# Patient Record
Sex: MALE | Race: BLACK OR AFRICAN AMERICAN | Employment: OTHER | URBAN - METROPOLITAN AREA
[De-identification: names, ages, dates, MRNs, and addresses within clinical notes are randomized per-mention and may not be internally consistent; named-entity substitution may affect disease eponyms.]

---

## 2017-04-26 ENCOUNTER — IP HISTORICAL/CONVERTED ENCOUNTER (OUTPATIENT)
Dept: OTHER | Age: 67
End: 2017-04-26

## 2018-06-26 ENCOUNTER — ED HISTORICAL/CONVERTED ENCOUNTER (OUTPATIENT)
Dept: OTHER | Age: 68
End: 2018-06-26

## 2019-08-08 ENCOUNTER — OP HISTORICAL/CONVERTED ENCOUNTER (OUTPATIENT)
Dept: OTHER | Age: 69
End: 2019-08-08

## 2021-01-11 ENCOUNTER — OFFICE VISIT (OUTPATIENT)
Dept: FAMILY MEDICINE CLINIC | Age: 71
End: 2021-01-11
Payer: MEDICARE

## 2021-01-11 VITALS
HEIGHT: 67 IN | SYSTOLIC BLOOD PRESSURE: 170 MMHG | BODY MASS INDEX: 30.13 KG/M2 | HEART RATE: 67 BPM | DIASTOLIC BLOOD PRESSURE: 96 MMHG | OXYGEN SATURATION: 99 % | TEMPERATURE: 96.8 F | RESPIRATION RATE: 20 BRPM | WEIGHT: 192 LBS

## 2021-01-11 DIAGNOSIS — I11.9 MALIGNANT HYPERTENSIVE HEART DISEASE WITHOUT HEART FAILURE: ICD-10-CM

## 2021-01-11 DIAGNOSIS — I25.2 HX OF MYOCARDIAL INFARCTION, GREATER THAN 8 WEEKS: ICD-10-CM

## 2021-01-11 DIAGNOSIS — Z79.4 TYPE 2 DIABETES MELLITUS WITH OTHER CIRCULATORY COMPLICATION, WITH LONG-TERM CURRENT USE OF INSULIN (HCC): ICD-10-CM

## 2021-01-11 DIAGNOSIS — R03.0 ELEVATED BLOOD PRESSURE READING: ICD-10-CM

## 2021-01-11 DIAGNOSIS — Z95.810 AUTOMATIC IMPLANTABLE CARDIAC DEFIBRILLATOR IN SITU: ICD-10-CM

## 2021-01-11 DIAGNOSIS — Z71.6 TOBACCO ABUSE COUNSELING: ICD-10-CM

## 2021-01-11 DIAGNOSIS — F17.200 TOBACCO DEPENDENCE: ICD-10-CM

## 2021-01-11 DIAGNOSIS — Z86.010 HISTORY OF ADENOMATOUS POLYP OF COLON: ICD-10-CM

## 2021-01-11 DIAGNOSIS — K21.00 GASTROESOPHAGEAL REFLUX DISEASE WITH ESOPHAGITIS WITHOUT HEMORRHAGE: Primary | ICD-10-CM

## 2021-01-11 DIAGNOSIS — E11.59 TYPE 2 DIABETES MELLITUS WITH OTHER CIRCULATORY COMPLICATION, WITH LONG-TERM CURRENT USE OF INSULIN (HCC): ICD-10-CM

## 2021-01-11 PROBLEM — I10 ESSENTIAL HYPERTENSION: Status: ACTIVE | Noted: 2021-01-11

## 2021-01-11 PROBLEM — L02.811 ABSCESS OF HEAD, EXCEPT FACE: Status: ACTIVE | Noted: 2021-01-11

## 2021-01-11 PROBLEM — L98.9 LESION OF SKIN OF SCALP: Status: ACTIVE | Noted: 2021-01-11

## 2021-01-11 PROBLEM — R73.9 HYPERGLYCEMIA: Status: ACTIVE | Noted: 2021-01-11

## 2021-01-11 PROBLEM — K80.20 CHOLELITHIASIS: Status: ACTIVE | Noted: 2021-01-11

## 2021-01-11 PROBLEM — I21.9 MYOCARDIAL INFARCTION (HCC): Status: ACTIVE | Noted: 2021-01-11

## 2021-01-11 PROBLEM — E78.5 HYPERLIPIDEMIA: Status: ACTIVE | Noted: 2021-01-11

## 2021-01-11 PROBLEM — R01.1 SYSTOLIC MURMUR: Status: ACTIVE | Noted: 2021-01-11

## 2021-01-11 PROBLEM — R10.13 EPIGASTRIC PAIN: Status: ACTIVE | Noted: 2021-01-11

## 2021-01-11 PROBLEM — E11.9 TYPE II DIABETES MELLITUS (HCC): Status: ACTIVE | Noted: 2021-01-11

## 2021-01-11 PROBLEM — R10.9 ABDOMINAL PAIN: Status: ACTIVE | Noted: 2021-01-11

## 2021-01-11 PROBLEM — K21.9 GASTROESOPHAGEAL REFLUX DISEASE: Status: ACTIVE | Noted: 2021-01-11

## 2021-01-11 LAB
GLUCOSE POC: 182 MG/DL
HBA1C MFR BLD HPLC: 8.7 %

## 2021-01-11 PROCEDURE — 83036 HEMOGLOBIN GLYCOSYLATED A1C: CPT | Performed by: FAMILY MEDICINE

## 2021-01-11 PROCEDURE — 99214 OFFICE O/P EST MOD 30 MIN: CPT | Performed by: FAMILY MEDICINE

## 2021-01-11 PROCEDURE — 82947 ASSAY GLUCOSE BLOOD QUANT: CPT | Performed by: FAMILY MEDICINE

## 2021-01-11 RX ORDER — INSULIN ASPART 100 [IU]/ML
INJECTION, SUSPENSION SUBCUTANEOUS
COMMUNITY
End: 2021-01-11 | Stop reason: SDUPTHER

## 2021-01-11 RX ORDER — LISINOPRIL 10 MG/1
TABLET ORAL DAILY
COMMUNITY

## 2021-01-11 RX ORDER — CLOPIDOGREL BISULFATE 75 MG/1
75 TABLET ORAL DAILY
Status: ON HOLD | COMMUNITY
End: 2021-06-01

## 2021-01-11 RX ORDER — ATORVASTATIN CALCIUM 40 MG/1
40 TABLET, FILM COATED ORAL DAILY
Qty: 90 TAB | Refills: 0 | Status: SHIPPED | OUTPATIENT
Start: 2021-01-11 | End: 2021-05-25 | Stop reason: SDUPTHER

## 2021-01-11 RX ORDER — RANITIDINE 150 MG/1
150 TABLET, FILM COATED ORAL 2 TIMES DAILY
Status: ON HOLD | COMMUNITY
End: 2021-06-01

## 2021-01-11 RX ORDER — ATORVASTATIN CALCIUM 40 MG/1
TABLET, FILM COATED ORAL DAILY
COMMUNITY
End: 2021-01-11 | Stop reason: SDUPTHER

## 2021-01-11 RX ORDER — PIOGLITAZONEHYDROCHLORIDE 45 MG/1
TABLET ORAL DAILY
COMMUNITY
End: 2021-01-11 | Stop reason: SDUPTHER

## 2021-01-11 RX ORDER — NICOTINE 7MG/24HR
1 PATCH, TRANSDERMAL 24 HOURS TRANSDERMAL EVERY 24 HOURS
Status: ON HOLD | COMMUNITY
End: 2021-06-01

## 2021-01-11 RX ORDER — DILTIAZEM HYDROCHLORIDE 120 MG/1
120 TABLET, FILM COATED ORAL DAILY
COMMUNITY
End: 2021-08-05

## 2021-01-11 RX ORDER — METOPROLOL SUCCINATE 50 MG/1
50 TABLET, EXTENDED RELEASE ORAL DAILY
Qty: 90 TAB | Refills: 0 | Status: SHIPPED | OUTPATIENT
Start: 2021-01-11 | End: 2021-08-05

## 2021-01-11 RX ORDER — PIOGLITAZONEHYDROCHLORIDE 45 MG/1
45 TABLET ORAL DAILY
Qty: 90 TAB | Refills: 0 | Status: SHIPPED | OUTPATIENT
Start: 2021-01-11 | End: 2021-04-11

## 2021-01-11 RX ORDER — METFORMIN HYDROCHLORIDE 1000 MG/1
1000 TABLET ORAL 2 TIMES DAILY WITH MEALS
COMMUNITY
End: 2022-01-26 | Stop reason: SDUPTHER

## 2021-01-11 RX ORDER — INSULIN ASPART 100 [IU]/ML
45 INJECTION, SUSPENSION SUBCUTANEOUS 2 TIMES DAILY
Qty: 5 PEN | Refills: 2 | Status: SHIPPED | OUTPATIENT
Start: 2021-01-11 | End: 2021-02-10

## 2021-01-11 RX ORDER — METOPROLOL SUCCINATE 50 MG/1
TABLET, EXTENDED RELEASE ORAL DAILY
COMMUNITY
End: 2021-01-11 | Stop reason: SDUPTHER

## 2021-01-11 NOTE — PROGRESS NOTES
Dash Lizama is a 79 y.o. male and presents with Follow Up Chronic Condition, Hypertension, and Medication Refill  . HPI     Subjective:  Cardiovascular Review:  The patient has hypertension   Diet and Lifestyle: generally follows a low fat low cholesterol diet, generally follows a low sodium diet, exercises sporadically  Home BP Monitoring: is not measured at home. Pertinent ROS: taking medications as instructed, no medication side effects noted, no TIA's, no chest pain on exertion, no dyspnea on exertion, no swelling of ankles. Review of Systems  Review of Systems   Constitutional: Negative. Negative for chills and fever. HENT: Negative. Negative for congestion, ear discharge, hearing loss, nosebleeds and tinnitus. Eyes: Negative. Negative for blurred vision, double vision, photophobia and pain. Respiratory: Negative. Negative for cough, hemoptysis and sputum production. Cardiovascular: Negative. Negative for chest pain and palpitations. Gastrointestinal: Negative. Negative for heartburn, nausea and vomiting. Genitourinary: Negative. Negative for dysuria, frequency and urgency. Musculoskeletal: Negative. Negative for back pain and myalgias. Skin: Negative. Neurological: Negative. Negative for dizziness, tingling, weakness and headaches. Endo/Heme/Allergies: Negative. Psychiatric/Behavioral: Negative. Negative for depression and suicidal ideas. The patient does not have insomnia. All other systems reviewed and are negative.         Past Medical History:   Diagnosis Date    Abdominal pain 1/11/2021    Abscess of head, except face 1/11/2021    Automatic implantable cardiac defibrillator in situ 1/11/2021    Cholelithiasis 1/11/2021    Elevated blood pressure reading 1/11/2021    Epigastric pain 1/11/2021    Essential hypertension 1/11/2021    Gastroesophageal reflux disease 1/11/2021    History of adenomatous polyp of colon 1/11/2021    Hyperglycemia 1/11/2021    Hyperlipidemia 1/11/2021    Lesion of skin of scalp 1/11/2021    Myocardial infarction ([facility] 0/80/5545    Systolic murmur 9/20/0279    Tobacco dependence 1/11/2021    Type II diabetes mellitus ([facility] 1/11/2021     Past Surgical History:   Procedure Laterality Date    HX IMPLANTABLE CARDIOVERTER DEFIBRILLATOR      HX PROSTATE SURGERY       Social History     Socioeconomic History    Marital status:      Spouse name: Not on file    Number of children: Not on file    Years of education: Not on file    Highest education level: Not on file   Tobacco Use    Smoking status: Current Every Day Smoker    Smokeless tobacco: Never Used   Substance and Sexual Activity    Alcohol use: Not Currently     No family history on file. Current Outpatient Medications   Medication Sig Dispense Refill    atorvastatin (LIPITOR) 40 mg tablet Take  by mouth daily.  clopidogreL (PLAVIX) 75 mg tab Take  by mouth.  dilTIAZem ER (CARDIZEM LA) 120 mg tablet Take 120 mg by mouth daily.  SITagliptin (Januvia) 100 mg tablet Take 100 mg by mouth daily.  empagliflozin (Jardiance) 10 mg tablet Take  by mouth daily.  lisinopriL (PRINIVIL, ZESTRIL) 10 mg tablet Take  by mouth daily.  metFORMIN (GLUCOPHAGE) 1,000 mg tablet Take 1,000 mg by mouth two (2) times daily (with meals).  metoprolol succinate (TOPROL-XL) 50 mg XL tablet Take  by mouth daily.  nicotine (NICODERM CQ) 7 mg/24 hr 1 Patch by TransDERmal route every twenty-four (24) hours.  insulin aspart protamine/insulin aspart (NovoLOG Mix 70-30FlexPen U-100) 100 unit/mL (70-30) inpn by SubCUTAneous route. Inject 45 units BID      pioglitazone (ACTOS) 45 mg tablet Take  by mouth daily.  raNITIdine (ZANTAC) 150 mg tablet Take 150 mg by mouth two (2) times a day.        No Known Allergies    Objective:  Visit Vitals  BP (!) 170/96 (BP 1 Location: Right arm, BP Patient Position: Sitting)   Pulse 67   Temp 96.8 °F (36 °C) (Temporal)   Resp 20   Ht 5' 7\" (1.702 m)   Wt 192 lb (87.1 kg)   SpO2 99% Comment: longo air   BMI 30.07 kg/m²       Physical Exam:   Physical Exam  Vitals signs and nursing note reviewed. Constitutional:       General: He is not in acute distress. Appearance: Normal appearance. He is obese. He is not ill-appearing, toxic-appearing or diaphoretic. HENT:      Head: Normocephalic and atraumatic. Right Ear: Tympanic membrane, ear canal and external ear normal. There is no impacted cerumen. Left Ear: Tympanic membrane, ear canal and external ear normal. There is no impacted cerumen. Nose: Nose normal. No congestion or rhinorrhea. Mouth/Throat:      Mouth: Mucous membranes are moist.      Pharynx: Oropharynx is clear. No oropharyngeal exudate or posterior oropharyngeal erythema. Eyes:      General: No scleral icterus. Right eye: No discharge. Left eye: No discharge. Extraocular Movements: Extraocular movements intact. Conjunctiva/sclera: Conjunctivae normal.      Pupils: Pupils are equal, round, and reactive to light. Neck:      Musculoskeletal: Normal range of motion and neck supple. No neck rigidity or muscular tenderness. Vascular: No carotid bruit. Cardiovascular:      Rate and Rhythm: Normal rate and regular rhythm. Pulses: Normal pulses. Heart sounds: Normal heart sounds. No murmur. No friction rub. No gallop. Pulmonary:      Effort: Pulmonary effort is normal. No respiratory distress. Breath sounds: Normal breath sounds. No stridor. No wheezing, rhonchi or rales. Chest:      Chest wall: No tenderness. Abdominal:      General: Abdomen is flat. Bowel sounds are normal. There is no distension. Palpations: Abdomen is soft. There is no mass. Tenderness: There is no abdominal tenderness. There is no right CVA tenderness, left CVA tenderness, guarding or rebound. Hernia: No hernia is present.    Musculoskeletal: Normal range of motion. General: No swelling, tenderness, deformity or signs of injury. Right lower leg: No edema. Left lower leg: No edema. Lymphadenopathy:      Cervical: No cervical adenopathy. Skin:     General: Skin is warm. Capillary Refill: Capillary refill takes 2 to 3 seconds. Coloration: Skin is not jaundiced or pale. Findings: No bruising, erythema, lesion or rash. Neurological:      General: No focal deficit present. Mental Status: He is alert and oriented to person, place, and time. Mental status is at baseline. Cranial Nerves: No cranial nerve deficit. Sensory: No sensory deficit. Motor: No weakness. Coordination: Coordination normal.      Gait: Gait normal.      Deep Tendon Reflexes: Reflexes normal.   Psychiatric:         Mood and Affect: Mood normal.         Behavior: Behavior normal.         Thought Content: Thought content normal.         Judgment: Judgment normal.             No results found for this or any previous visit. Assessment/Plan:    ICD-10-CM ICD-9-CM    1. Gastroesophageal reflux disease with esophagitis without hemorrhage  K21.00 530.81      530.10    2. Type 2 diabetes mellitus with other circulatory complication, with long-term current use of insulin (HCC)  E11.59 250.70 LIPID PANEL    A90.5 G93.01 METABOLIC PANEL, BASIC      HEMOGLOBIN A1C WITH EAG   3. History of adenomatous polyp of colon  Z86.010 V12.72    4. Tobacco dependence  F17.200 305.1    5. Automatic implantable cardiac defibrillator in situ  Z95.810 V45.02    6. Hx of myocardial infarction, greater than 8 weeks  I25.2 412    7. Malignant hypertensive heart disease without heart failure  I11.9 402.00 LIPID PANEL      METABOLIC PANEL, BASIC   8. Elevated blood pressure reading  R03.0 796.2    9. Tobacco abuse counseling  Z71.6 V65.42      305.1      No orders of the defined types were placed in this encounter.     Cannot display discharge medications since this is not an admission.

## 2021-01-26 ENCOUNTER — IMMUNIZATION (OUTPATIENT)
Dept: FAMILY MEDICINE CLINIC | Age: 71
End: 2021-01-26
Payer: MEDICARE

## 2021-01-26 DIAGNOSIS — Z23 ENCOUNTER FOR IMMUNIZATION: Primary | ICD-10-CM

## 2021-01-26 PROCEDURE — 91301 COVID-19, MRNA, LNP-S, PF, 100MCG/0.5ML DOSE(MODERNA): CPT | Performed by: FAMILY MEDICINE

## 2021-02-24 ENCOUNTER — IMMUNIZATION (OUTPATIENT)
Dept: FAMILY MEDICINE CLINIC | Age: 71
End: 2021-02-24
Payer: MEDICARE

## 2021-02-24 DIAGNOSIS — Z23 ENCOUNTER FOR IMMUNIZATION: Primary | ICD-10-CM

## 2021-02-24 PROCEDURE — 0012A COVID-19, MRNA, LNP-S, PF, 100MCG/0.5ML DOSE(MODERNA): CPT | Performed by: FAMILY MEDICINE

## 2021-02-24 PROCEDURE — 91301 COVID-19, MRNA, LNP-S, PF, 100MCG/0.5ML DOSE(MODERNA): CPT | Performed by: FAMILY MEDICINE

## 2021-04-12 ENCOUNTER — OFFICE VISIT (OUTPATIENT)
Dept: FAMILY MEDICINE CLINIC | Age: 71
End: 2021-04-12
Payer: MEDICARE

## 2021-04-12 VITALS
HEART RATE: 61 BPM | SYSTOLIC BLOOD PRESSURE: 112 MMHG | OXYGEN SATURATION: 94 % | BODY MASS INDEX: 28.72 KG/M2 | HEIGHT: 67 IN | WEIGHT: 183 LBS | RESPIRATION RATE: 16 BRPM | DIASTOLIC BLOOD PRESSURE: 72 MMHG

## 2021-04-12 DIAGNOSIS — E11.65 POORLY CONTROLLED DIABETES MELLITUS (HCC): ICD-10-CM

## 2021-04-12 DIAGNOSIS — Z71.6 TOBACCO ABUSE COUNSELING: ICD-10-CM

## 2021-04-12 DIAGNOSIS — E11.59 TYPE 2 DIABETES MELLITUS WITH OTHER CIRCULATORY COMPLICATION, WITH LONG-TERM CURRENT USE OF INSULIN (HCC): Primary | ICD-10-CM

## 2021-04-12 DIAGNOSIS — R73.9 HYPERGLYCEMIA: ICD-10-CM

## 2021-04-12 DIAGNOSIS — Z79.4 TYPE 2 DIABETES MELLITUS WITH OTHER CIRCULATORY COMPLICATION, WITH LONG-TERM CURRENT USE OF INSULIN (HCC): Primary | ICD-10-CM

## 2021-04-12 DIAGNOSIS — F17.200 TOBACCO DEPENDENCE: ICD-10-CM

## 2021-04-12 DIAGNOSIS — I11.9 MALIGNANT HYPERTENSIVE HEART DISEASE WITHOUT HEART FAILURE: ICD-10-CM

## 2021-04-12 DIAGNOSIS — Z12.11 ENCOUNTER FOR SCREENING COLONOSCOPY: ICD-10-CM

## 2021-04-12 DIAGNOSIS — I25.2 HX OF MYOCARDIAL INFARCTION, GREATER THAN 8 WEEKS: ICD-10-CM

## 2021-04-12 DIAGNOSIS — Z95.810 AUTOMATIC IMPLANTABLE CARDIAC DEFIBRILLATOR IN SITU: ICD-10-CM

## 2021-04-12 DIAGNOSIS — R01.1 SYSTOLIC MURMUR: ICD-10-CM

## 2021-04-12 DIAGNOSIS — E78.2 MIXED HYPERLIPIDEMIA: ICD-10-CM

## 2021-04-12 DIAGNOSIS — Z79.4 TYPE 2 DIABETES MELLITUS WITH OTHER CIRCULATORY COMPLICATION, WITH LONG-TERM CURRENT USE OF INSULIN (HCC): ICD-10-CM

## 2021-04-12 DIAGNOSIS — E11.59 TYPE 2 DIABETES MELLITUS WITH OTHER CIRCULATORY COMPLICATION, WITH LONG-TERM CURRENT USE OF INSULIN (HCC): ICD-10-CM

## 2021-04-12 LAB — HBA1C MFR BLD HPLC: 11.1 %

## 2021-04-12 PROCEDURE — 83036 HEMOGLOBIN GLYCOSYLATED A1C: CPT | Performed by: FAMILY MEDICINE

## 2021-04-12 PROCEDURE — 2022F DILAT RTA XM EVC RTNOPTHY: CPT | Performed by: FAMILY MEDICINE

## 2021-04-12 PROCEDURE — 3046F HEMOGLOBIN A1C LEVEL >9.0%: CPT | Performed by: FAMILY MEDICINE

## 2021-04-12 PROCEDURE — 99214 OFFICE O/P EST MOD 30 MIN: CPT | Performed by: FAMILY MEDICINE

## 2021-04-12 PROCEDURE — G8417 CALC BMI ABV UP PARAM F/U: HCPCS | Performed by: FAMILY MEDICINE

## 2021-04-12 PROCEDURE — G8536 NO DOC ELDER MAL SCRN: HCPCS | Performed by: FAMILY MEDICINE

## 2021-04-12 PROCEDURE — G8754 DIAS BP LESS 90: HCPCS | Performed by: FAMILY MEDICINE

## 2021-04-12 PROCEDURE — G8432 DEP SCR NOT DOC, RNG: HCPCS | Performed by: FAMILY MEDICINE

## 2021-04-12 PROCEDURE — 3017F COLORECTAL CA SCREEN DOC REV: CPT | Performed by: FAMILY MEDICINE

## 2021-04-12 PROCEDURE — G8427 DOCREV CUR MEDS BY ELIG CLIN: HCPCS | Performed by: FAMILY MEDICINE

## 2021-04-12 PROCEDURE — G8752 SYS BP LESS 140: HCPCS | Performed by: FAMILY MEDICINE

## 2021-04-12 PROCEDURE — 1101F PT FALLS ASSESS-DOCD LE1/YR: CPT | Performed by: FAMILY MEDICINE

## 2021-04-12 RX ORDER — INSULIN ASPART 100 [IU]/ML
INJECTION, SUSPENSION SUBCUTANEOUS
COMMUNITY
Start: 2021-03-26 | End: 2022-07-20 | Stop reason: SDUPTHER

## 2021-04-12 NOTE — PROGRESS NOTES
Room: 1    Identified pt with two pt identifiers(name and ). Reviewed record in preparation for visit and have obtained necessary documentation. All patient medications has been reviewed. Chief Complaint   Patient presents with    Diabetes    GERD    Blood Pressure Check       Health Maintenance Due   Topic    Hepatitis C Screening     Foot Exam Q1     A1C test (Diabetic or Prediabetic)     MICROALBUMIN Q1     Eye Exam Retinal or Dilated     Lipid Screen     DTaP/Tdap/Td series (1 - Tdap)    Shingrix Vaccine Age 49> (1 of 2)    Colorectal Cancer Screening Combo     Pneumococcal 65+ years (1 of 1 - PPSV23)    Medicare Yearly Exam      Eye exam: 1 mo ago [facility]    Colonoscopy: overdue     PPSV23: completed       Vitals:    21 1039   BP: 112/72   Pulse: 61   Resp: 16   SpO2: 94%   Weight: 183 lb (83 kg)   Height: 5' 7\" (1.702 m)   PainSc:   0 - No pain       4. Have you been to the ER, urgent care clinic since your last visit? Hospitalized since your last visit? No    5. Have you seen or consulted any other health care providers outside of the [address] [place] [address] since your last visit? Include any pap smears or colon screening. No        Patient is accompanied by self I have received verbal consent from Arian Mayer to discuss any/all medical information while they are present in the room.

## 2021-04-12 NOTE — PROGRESS NOTES
Bishop Gutierres is a 70 y.o. male and presents with Diabetes, GERD, and Blood Pressure Check  . HPI     Subjective:  Cardiovascular Review:  The patient has hypertension   Diet and Lifestyle: generally follows a low fat low cholesterol diet, generally follows a low sodium diet, exercises sporadically  Home BP Monitoring: is not measured at home. Pertinent ROS: taking medications as instructed, no medication side effects noted, no TIA's, no chest pain on exertion, no dyspnea on exertion, no swelling of ankles. Review of Systems  Review of Systems   Constitutional: Negative. Negative for chills and fever. HENT: Negative. Negative for congestion, ear discharge, hearing loss, nosebleeds and tinnitus. Eyes: Negative. Negative for blurred vision, double vision, photophobia and pain. Respiratory: Negative. Negative for cough, hemoptysis and sputum production. Cardiovascular: Negative. Negative for chest pain and palpitations. Gastrointestinal: Negative. Negative for heartburn, nausea and vomiting. Genitourinary: Negative. Negative for dysuria, frequency and urgency. Musculoskeletal: Negative. Negative for back pain and myalgias. Skin: Negative. Neurological: Negative. Negative for dizziness, tingling, weakness and headaches. Endo/Heme/Allergies: Negative. Psychiatric/Behavioral: Negative. Negative for depression and suicidal ideas. The patient does not have insomnia. All other systems reviewed and are negative.         Past Medical History:   Diagnosis Date    Abdominal pain 1/11/2021    Abscess of head, except face 1/11/2021    Automatic implantable cardiac defibrillator in situ 1/11/2021    Cholelithiasis 1/11/2021    Elevated blood pressure reading 1/11/2021    Epigastric pain 1/11/2021    Essential hypertension 1/11/2021    Gastroesophageal reflux disease 1/11/2021    History of adenomatous polyp of colon 1/11/2021    Hyperglycemia 1/11/2021    Hyperlipidemia 1/11/2021    Lesion of skin of scalp 1/11/2021    Myocardial infarction ([facility] 4/93/4583    Systolic murmur 3/91/6606    Tobacco dependence 1/11/2021    Type II diabetes mellitus ([facility] 1/11/2021     Past Surgical History:   Procedure Laterality Date    HX IMPLANTABLE CARDIOVERTER DEFIBRILLATOR      HX PROSTATE SURGERY       Social History     Socioeconomic History    Marital status:      Spouse name: Not on file    Number of children: Not on file    Years of education: Not on file    Highest education level: Not on file   Tobacco Use    Smoking status: Current Every Day Smoker     Packs/day: 0.25     Years: 20.00     Pack years: 5.00     Types: Cigarettes    Smokeless tobacco: Never Used   Substance and Sexual Activity    Alcohol use: Not Currently    Drug use: Never     No family history on file. Current Outpatient Medications   Medication Sig Dispense Refill    NovoLOG Mix 70-30FlexPen U-100 100 unit/mL (70-30) inpn 50 UNITS TWICE A DAY      clopidogreL (PLAVIX) 75 mg tab Take 75 mg by mouth daily.  lisinopriL (PRINIVIL, ZESTRIL) 10 mg tablet Take  by mouth daily.  metFORMIN (GLUCOPHAGE) 1,000 mg tablet Take 1,000 mg by mouth two (2) times daily (with meals).  nicotine (NICODERM CQ) 7 mg/24 hr 1 Patch by TransDERmal route every twenty-four (24) hours.  atorvastatin (LIPITOR) 40 mg tablet Take 1 Tab by mouth daily for 90 days. 90 Tab 0    metoprolol succinate (TOPROL-XL) 50 mg XL tablet Take 1 Tab by mouth daily for 90 days. 90 Tab 0    dilTIAZem IR (CARDIZEM) 120 mg tablet Take 120 mg by mouth daily.  SITagliptin (Januvia) 100 mg tablet Take 100 mg by mouth daily.  empagliflozin (Jardiance) 10 mg tablet Take  by mouth daily.  raNITIdine (ZANTAC) 150 mg tablet Take 150 mg by mouth two (2) times a day.        No Known Allergies    Objective:  Visit Vitals  /72 (BP 1 Location: Left upper arm, BP Patient Position: Sitting, BP Cuff Size: Adult)   Pulse 61   Resp 16   Ht 5' 7\" (1.702 m)   Wt 183 lb (83 kg)   SpO2 94%   BMI 28.66 kg/m²       Physical Exam:   Physical Exam  Vitals signs and nursing note reviewed. Constitutional:       General: He is not in acute distress. Appearance: Normal appearance. He is obese. He is not ill-appearing, toxic-appearing or diaphoretic. HENT:      Head: Normocephalic and atraumatic. Right Ear: Tympanic membrane, ear canal and external ear normal. There is no impacted cerumen. Left Ear: Tympanic membrane, ear canal and external ear normal. There is no impacted cerumen. Nose: Nose normal. No congestion or rhinorrhea. Mouth/Throat:      Mouth: Mucous membranes are moist.      Pharynx: Oropharynx is clear. No oropharyngeal exudate or posterior oropharyngeal erythema. Eyes:      General: No scleral icterus. Right eye: No discharge. Left eye: No discharge. Extraocular Movements: Extraocular movements intact. Conjunctiva/sclera: Conjunctivae normal.      Pupils: Pupils are equal, round, and reactive to light. Neck:      Musculoskeletal: Normal range of motion and neck supple. No neck rigidity or muscular tenderness. Vascular: No carotid bruit. Cardiovascular:      Rate and Rhythm: Normal rate and regular rhythm. Pulses: Normal pulses. Heart sounds: Normal heart sounds. No murmur. No friction rub. No gallop. Comments: 1/6 systolic murmur  Pulmonary:      Effort: Pulmonary effort is normal. No respiratory distress. Breath sounds: Normal breath sounds. No stridor. No wheezing, rhonchi or rales. Chest:      Chest wall: No tenderness. Abdominal:      General: Abdomen is flat. Bowel sounds are normal. There is no distension. Palpations: Abdomen is soft. There is no mass. Tenderness: There is no abdominal tenderness. There is no right CVA tenderness, left CVA tenderness, guarding or rebound. Hernia: No hernia is present.    Musculoskeletal: Normal range of motion. General: No swelling, tenderness, deformity or signs of injury. Right lower leg: No edema. Left lower leg: No edema. Lymphadenopathy:      Cervical: No cervical adenopathy. Skin:     General: Skin is warm. Capillary Refill: Capillary refill takes 2 to 3 seconds. Coloration: Skin is not jaundiced or pale. Findings: No bruising, erythema, lesion or rash. Neurological:      General: No focal deficit present. Mental Status: He is alert and oriented to person, place, and time. Mental status is at baseline. Cranial Nerves: No cranial nerve deficit. Sensory: No sensory deficit. Motor: No weakness. Coordination: Coordination normal.      Gait: Gait normal.      Deep Tendon Reflexes: Reflexes normal.   Psychiatric:         Mood and Affect: Mood normal.         Behavior: Behavior normal.         Thought Content: Thought content normal.         Judgment: Judgment normal.             Results for orders placed or performed in visit on 04/12/21   AMB POC HEMOGLOBIN A1C   Result Value Ref Range    Hemoglobin A1c (POC) 11.1 %       Assessment/Plan:    ICD-10-CM ICD-9-CM    1. Type 2 diabetes mellitus with other circulatory complication, with long-term current use of insulin ([facility])  E11.59 250.70 MICROALBUMIN, UR, RAND W/ MICROALB/CREAT RATIO    Z79.4 V58.67 AMB POC HEMOGLOBIN A1C      LIPID PANEL      HEMOGLOBIN A1C WITH EAG   2. Hx of myocardial infarction, greater than 8 weeks  I25.2 412    3. Malignant hypertensive heart disease without heart failure  I11.9 402.00    4. Poorly controlled diabetes mellitus (HCC)  E11.65 250.00     Increase Humalog 70/30 from 50 units twice daily to 60 units twice daily for markedly elevated hgba1c of 11.1 today   5. Automatic implantable cardiac defibrillator in situ  Z95.810 V45.02    6. Mixed hyperlipidemia  E78.2 272.2    7. Hyperglycemia  R73.9 790.29    8. Systolic murmur  R50.1 029.1    9.  Tobacco abuse counseling  Z71.6 V65.42      305.1    10. Tobacco dependence  F17.200 305.1      Orders Placed This Encounter    MICROALBUMIN, UR, RAND W/ MICROALB/CREAT RATIO     Standing Status:   Future     Standing Expiration Date:   2022    LIPID PANEL     Standing Status:   Future     Standing Expiration Date:   2022    HEMOGLOBIN A1C WITH EAG    AMB POC HEMOGLOBIN A1C    NovoLOG Mix 70-30FlexPen U-100 100 unit/mL (70-30) inpn     Si UNITS TWICE A DAY     Cannot display discharge medications since this is not an admission.

## 2021-04-30 ENCOUNTER — TELEPHONE (OUTPATIENT)
Dept: FAMILY MEDICINE CLINIC | Age: 71
End: 2021-04-30

## 2021-04-30 DIAGNOSIS — E11.65 POORLY CONTROLLED DIABETES MELLITUS (HCC): Primary | ICD-10-CM

## 2021-04-30 RX ORDER — LANCING DEVICE/LANCETS
1 KIT MISCELLANEOUS 2 TIMES DAILY
Qty: 1 KIT | Refills: 1 | Status: SHIPPED | OUTPATIENT
Start: 2021-04-30

## 2021-04-30 RX ORDER — BLOOD SUGAR DIAGNOSTIC
STRIP MISCELLANEOUS
Qty: 100 STRIP | Refills: 1 | Status: SHIPPED | OUTPATIENT
Start: 2021-04-30

## 2021-04-30 RX ORDER — ISOPROPYL ALCOHOL 70 ML/100ML
1 SWAB TOPICAL 2 TIMES DAILY
Qty: 100 PAD | Refills: 1 | Status: SHIPPED | OUTPATIENT
Start: 2021-04-30

## 2021-04-30 RX ORDER — BLOOD-GLUCOSE METER
1 EACH MISCELLANEOUS 2 TIMES DAILY
Qty: 1 EACH | Refills: 0 | Status: SHIPPED | OUTPATIENT
Start: 2021-04-30

## 2021-04-30 RX ORDER — BLOOD GLUCOSE CONTROL HIGH,LOW
3 EACH MISCELLANEOUS AS NEEDED
Qty: 1 BOTTLE | Refills: 1 | Status: SHIPPED | OUTPATIENT
Start: 2021-04-30

## 2021-04-30 NOTE — TELEPHONE ENCOUNTER
Spoke with nurse with [facility] [employer] who states , pt is requesting the following: accu check avia meter     Soft clix lancets      Test strips     Alcohol pads and control solution   To be sent to human mail order      PCP: Leandra Velazquez MD    Last appt: 2021  Future Appointments   Date Time Provider Michael Ac   2021 10:15 AM Leandra Velazquez MD [facility] BS AMB       Requested Prescriptions     Pending Prescriptions Disp Refills    Blood-Glucose Meter (Accu-Chek Lyudmila Plus Meter) misc 1 Each 0     Si Device by Does Not Apply route two (2) times a day.  glucose blood VI test strips (Accu-Chek Lyudmila Plus test strp) strip 100 Strip 1     Sig: Test blood sugar twice a day    Lancing Device with Lancets (Accu-Chek Soft Dev Lancets) kit 1 Kit 1     Si Device by Does Not Apply route two (2) times a day.  Blood Glucose Control High&Low (Accu-Chek Lyudmila Control Soln) soln 1 Bottle 1     Sig: 3 mL by Does Not Apply route as needed (as needed for controls).  alcohol swabs (Alcohol Pads) padm 100 Pad 1     Si Pad by Apply Externally route two (2) times a day.

## 2021-05-21 ENCOUNTER — TELEPHONE (OUTPATIENT)
Dept: SURGERY | Age: 71
End: 2021-05-21

## 2021-05-21 RX ORDER — POLYETHYLENE GLYCOL 3350, SODIUM SULFATE ANHYDROUS, SODIUM BICARBONATE, SODIUM CHLORIDE, POTASSIUM CHLORIDE 236; 22.74; 6.74; 5.86; 2.97 G/4L; G/4L; G/4L; G/4L; G/4L
POWDER, FOR SOLUTION ORAL
Qty: 1 BOTTLE | Refills: 0 | Status: SHIPPED | OUTPATIENT
Start: 2021-05-21

## 2021-05-25 NOTE — TELEPHONE ENCOUNTER
Mady Hurtado MD, patient out of refills. Rx pended for your signature/modification as appropriate    LOV: 4/12/21  Next: 7/12/21    Thank you,  Roberto Stoll, PharmD, [address]  Direct: [phone]  Department, toll free: [phone], option 7     =========================================================  CLINICAL PHARMACY: ADHERENCE REVIEW  [place] [facility] [facility]: Statin adherence    Last Visit: 4/12/21    Patient also appears to be prescribed: DM (including insulin), ACEi    Patient found in Outcomes MTM and is not currently eligible for CMR/TIP    ASSESSMENT  ACE/ARB ADHERENCE    Per Insurance Records through 5/3/21: no dispense YTD    Per Outcomes MT Records: no dispense YTD    Per chart: lisinopril 10mg daily is historical listing    BP Readings from Last 3 Encounters:   04/12/21 112/72   01/11/21 (!) 170/96     CrCl cannot be calculated (No successful lab value found. ). STATIN ADHERENCE    Per Insurance Records through 5/3/21 (YTD [facility] = 79%; Potential Fail Date: 6/20/21): Atorvastatin 40mg last filled on 1/11/21 for 90 day supply. Next refill due: 4/10/21    Per chart, fell off medication list after the 90-day rx (@4/12/21), 0 refills    No results found for: CHOL, CHOLPOCT, CHOLX, CHLST, CHOLV, HDL, HDLPOC, HDLP, LDL, LDLCPOC, LDLC, DLDLP, VLDLC, VLDL, TGLX, TRIGL, TRIGP, TGLPOCT, CHHD, CHHDX  No results found for: ALT, AST  The ASCVD Risk score (Sulma Wilson, et al., 2013) failed to calculate for the following reasons: The patient has a prior MI or stroke diagnosis     PLAN  The following are interventions that have been identified:  - Patient overdue refilling atorvastatin (refill was due @4/10 - likely needs reordered) and active on home medication list   - Taking lisinopril? (on med list as historical; no dispense YTD)    Reached patient to review.  States he's still taking atorvastatin and takes the one tablet every day. Patient brief in conversation - inquired if he was running low on any of his other medications and patient denied any medication/refill needs.      Future Appointments   Date Time Provider Michael Ac   7/12/2021 10:15 AM Melody Tao MD [facility] BS AMB

## 2021-05-26 RX ORDER — ATORVASTATIN CALCIUM 40 MG/1
40 TABLET, FILM COATED ORAL DAILY
Qty: 90 TABLET | Refills: 1 | Status: SHIPPED | OUTPATIENT
Start: 2021-05-26 | End: 2022-01-26 | Stop reason: SDUPTHER

## 2021-05-26 NOTE — TELEPHONE ENCOUNTER
Noted atorvastatin reordered, thank you!     Spoke to [employer] - confirm receiving and they will fill today since will be eligible to refill.    =========================================================   For Pharmacy Admin Tracking Only     CPA in place: No   Recommendation Provided To: Provider: 1 via Note to Provider  and Patient/Caregiver: 1 via Telephone   Intervention Detail: Adherence Monitorin and Refill(s) Provided   Gap Closed?: Yes   Total # of Interventions Recommended: 2   Total # of Interventions Accepted: 1   Intervention Accepted By: Provider: 1 and Patient/Caregiver: 0   Time Spent (min): 20

## 2021-05-27 ENCOUNTER — TELEPHONE (OUTPATIENT)
Dept: SURGERY | Age: 71
End: 2021-05-27

## 2021-05-27 NOTE — TELEPHONE ENCOUNTER
PA initiated for Colonoscopy spoke with Trudi at [employer] [phone]  Tracking # for code [id number],   [id number]  Was told a specialist would be reaching out to verify speciality verification with insurance

## 2021-05-28 ENCOUNTER — HOSPITAL ENCOUNTER (OUTPATIENT)
Dept: PREADMISSION TESTING | Age: 71
Discharge: HOME | End: 2021-05-28
Payer: MEDICARE

## 2021-05-28 LAB — SARS-COV-2, COV2: NORMAL

## 2021-05-28 PROCEDURE — U0003 INFECTIOUS AGENT DETECTION BY NUCLEIC ACID (DNA OR RNA); SEVERE ACUTE RESPIRATORY SYNDROME CORONAVIRUS 2 (SARS-COV-2) (CORONAVIRUS DISEASE [COVID-19]), AMPLIFIED PROBE TECHNIQUE, MAKING USE OF HIGH THROUGHPUT TECHNOLOGIES AS DESCRIBED BY CMS-2020-01-R: HCPCS

## 2021-05-29 LAB — SARS-COV-2, COV2NT: NOT DETECTED

## 2021-06-01 ENCOUNTER — ANESTHESIA (OUTPATIENT)
Dept: ENDOSCOPY | Age: 71
End: 2021-06-01
Payer: MEDICARE

## 2021-06-01 ENCOUNTER — HOSPITAL ENCOUNTER (OUTPATIENT)
Age: 71
Setting detail: OUTPATIENT SURGERY
Discharge: HOME | End: 2021-06-01
Attending: COLON & RECTAL SURGERY | Admitting: COLON & RECTAL SURGERY
Payer: MEDICARE

## 2021-06-01 ENCOUNTER — APPOINTMENT (OUTPATIENT)
Dept: ENDOSCOPY | Age: 71
End: 2021-06-01
Attending: COLON & RECTAL SURGERY
Payer: MEDICARE

## 2021-06-01 ENCOUNTER — ANESTHESIA EVENT (OUTPATIENT)
Dept: ENDOSCOPY | Age: 71
End: 2021-06-01
Payer: MEDICARE

## 2021-06-01 VITALS
HEART RATE: 59 BPM | OXYGEN SATURATION: 100 % | HEIGHT: 67 IN | WEIGHT: 185 LBS | DIASTOLIC BLOOD PRESSURE: 61 MMHG | TEMPERATURE: 97.8 F | BODY MASS INDEX: 29.03 KG/M2 | RESPIRATION RATE: 18 BRPM | SYSTOLIC BLOOD PRESSURE: 124 MMHG

## 2021-06-01 LAB
GLUCOSE BLD STRIP.AUTO-MCNC: 143 MG/DL (ref 65–117)
PERFORMED BY, TECHID: ABNORMAL

## 2021-06-01 PROCEDURE — 76060000034 HC ANESTHESIA 1.5 TO 2 HR: Performed by: COLON & RECTAL SURGERY

## 2021-06-01 PROCEDURE — 76040000009: Performed by: COLON & RECTAL SURGERY

## 2021-06-01 PROCEDURE — 74011250636 HC RX REV CODE- 250/636: Performed by: NURSE ANESTHETIST, CERTIFIED REGISTERED

## 2021-06-01 PROCEDURE — 74011250636 HC RX REV CODE- 250/636: Performed by: COLON & RECTAL SURGERY

## 2021-06-01 PROCEDURE — 82962 GLUCOSE BLOOD TEST: CPT

## 2021-06-01 PROCEDURE — 2709999900 HC NON-CHARGEABLE SUPPLY: Performed by: COLON & RECTAL SURGERY

## 2021-06-01 PROCEDURE — 74011000250 HC RX REV CODE- 250: Performed by: NURSE ANESTHETIST, CERTIFIED REGISTERED

## 2021-06-01 RX ORDER — SODIUM CHLORIDE, SODIUM LACTATE, POTASSIUM CHLORIDE, CALCIUM CHLORIDE 600; 310; 30; 20 MG/100ML; MG/100ML; MG/100ML; MG/100ML
INJECTION, SOLUTION INTRAVENOUS
Status: DISCONTINUED | OUTPATIENT
Start: 2021-06-01 | End: 2021-06-01 | Stop reason: HOSPADM

## 2021-06-01 RX ORDER — PROPOFOL 10 MG/ML
INJECTION, EMULSION INTRAVENOUS
Status: COMPLETED
Start: 2021-06-01 | End: 2021-06-01

## 2021-06-01 RX ORDER — SODIUM CHLORIDE, SODIUM LACTATE, POTASSIUM CHLORIDE, CALCIUM CHLORIDE 600; 310; 30; 20 MG/100ML; MG/100ML; MG/100ML; MG/100ML
50 INJECTION, SOLUTION INTRAVENOUS CONTINUOUS
Status: DISCONTINUED | OUTPATIENT
Start: 2021-06-01 | End: 2021-06-01 | Stop reason: HOSPADM

## 2021-06-01 RX ORDER — ASPIRIN 81 MG/1
81 TABLET ORAL DAILY
COMMUNITY

## 2021-06-01 RX ORDER — PHENYLEPHRINE HCL IN 0.9% NACL 1 MG/10 ML
SYRINGE (ML) INTRAVENOUS
Status: DISCONTINUED
Start: 2021-06-01 | End: 2021-06-01 | Stop reason: HOSPADM

## 2021-06-01 RX ORDER — PROPOFOL 10 MG/ML
INJECTION, EMULSION INTRAVENOUS AS NEEDED
Status: DISCONTINUED | OUTPATIENT
Start: 2021-06-01 | End: 2021-06-01 | Stop reason: HOSPADM

## 2021-06-01 RX ADMIN — PROPOFOL 30 MG: 10 INJECTION, EMULSION INTRAVENOUS at 08:16

## 2021-06-01 RX ADMIN — PROPOFOL 50 MG: 10 INJECTION, EMULSION INTRAVENOUS at 08:26

## 2021-06-01 RX ADMIN — SODIUM CHLORIDE, POTASSIUM CHLORIDE, SODIUM LACTATE AND CALCIUM CHLORIDE 50 ML/HR: 600; 310; 30; 20 INJECTION, SOLUTION INTRAVENOUS at 07:57

## 2021-06-01 RX ADMIN — PHENYLEPHRINE HYDROCHLORIDE 100 MCG: 10 INJECTION INTRAVENOUS at 08:24

## 2021-06-01 RX ADMIN — PROPOFOL 100 MG: 10 INJECTION, EMULSION INTRAVENOUS at 08:13

## 2021-06-01 RX ADMIN — SODIUM CHLORIDE, POTASSIUM CHLORIDE, SODIUM LACTATE AND CALCIUM CHLORIDE: 600; 310; 30; 20 INJECTION, SOLUTION INTRAVENOUS at 08:00

## 2021-06-01 RX ADMIN — PHENYLEPHRINE HYDROCHLORIDE 100 MCG: 10 INJECTION INTRAVENOUS at 08:17

## 2021-06-01 RX ADMIN — PROPOFOL 30 MG: 10 INJECTION, EMULSION INTRAVENOUS at 08:39

## 2021-06-01 RX ADMIN — PROPOFOL 50 MG: 10 INJECTION, EMULSION INTRAVENOUS at 08:33

## 2021-06-01 RX ADMIN — PROPOFOL 40 MG: 10 INJECTION, EMULSION INTRAVENOUS at 08:20

## 2021-06-01 RX ADMIN — PHENYLEPHRINE HYDROCHLORIDE 100 MCG: 10 INJECTION INTRAVENOUS at 08:14

## 2021-06-01 NOTE — PROGRESS NOTES
VSS, iv cannula discontinued, pt requesting to leave before talking to physician, discharge education and paperwork given, pt discharged home stable

## 2021-06-01 NOTE — ANESTHESIA PREPROCEDURE EVALUATION
Relevant Problems   No relevant active problems       Anesthetic History   No history of anesthetic complications            Review of Systems / Medical History  Patient summary reviewed, nursing notes reviewed and pertinent labs reviewed    Pulmonary          Smoker         Neuro/Psych   Within defined limits           Cardiovascular    Hypertension          Pacemaker, past MI, CAD and hyperlipidemia    Exercise tolerance: >4 METS     GI/Hepatic/Renal     GERD           Endo/Other    Diabetes         Other Findings          Past Medical History:   Diagnosis Date    Abdominal pain 1/11/2021    Abscess of head, except face 1/11/2021    Automatic implantable cardiac defibrillator in situ 1/11/2021    Cholelithiasis 1/11/2021    Elevated blood pressure reading 1/11/2021    Epigastric pain 1/11/2021    Essential hypertension 1/11/2021    Gastroesophageal reflux disease 1/11/2021    History of adenomatous polyp of colon 1/11/2021    Hyperglycemia 1/11/2021    Hyperlipidemia 1/11/2021    Lesion of skin of scalp 1/11/2021    Myocardial infarction ([facility] [address] 0/73/4741    Systolic murmur 3/81/0726    Tobacco dependence 1/11/2021    Type II diabetes mellitus ([facility] [address] 1/11/2021       Past Surgical History:   Procedure Laterality Date    HX COLONOSCOPY      HX IMPLANTABLE CARDIOVERTER DEFIBRILLATOR      HX PROSTATE SURGERY         Current Outpatient Medications   Medication Instructions    alcohol swabs (Alcohol Pads) padm 1 Pad, Apply Externally, 2 TIMES DAILY    atorvastatin (LIPITOR) 40 mg, Oral, DAILY    Blood Glucose Control High&Low (Accu-Chek Lyudmila Control Soln) soln 3 mL, Does Not Apply, AS NEEDED    Blood-Glucose Meter (Accu-Chek Lyudmila Plus Meter) misc 1 Device, Does Not Apply, 2 TIMES DAILY    dilTIAZem IR (CARDIZEM) 120 mg, Oral, DAILY    empagliflozin (Jardiance) 10 mg tablet Oral, DAILY    glucose blood VI test strips (Accu-Chek Lyudmila Plus test strp) strip Test blood sugar twice a day    Lancing Device with Lancets (Accu-Chek Soft Dev Lancets) kit 1 Device, Does Not Apply, 2 TIMES DAILY    lisinopriL (PRINIVIL, ZESTRIL) 10 mg tablet Oral, DAILY    metFORMIN (GLUCOPHAGE) 1,000 mg, Oral, 2 TIMES DAILY WITH MEALS    NovoLOG Mix 70-30FlexPen U-100 100 unit/mL (70-30) inpn 50 UNITS TWICE A DAY    PEG 3350-Electrolytes (Golytely) 236-22.74-6.74 -5.86 gram suspension Used as directed    raNITIdine (ZANTAC) 150 mg, Oral, 2 TIMES DAILY       Current Facility-Administered Medications   Medication Dose Route Frequency    lactated Ringers infusion  50 mL/hr IntraVENous CONTINUOUS    propofoL (DIPRIVAN) 10 mg/mL injection           No data found. No results found for: WBC, WBCLT, HGBPOC, HGB, HGBP, HCTPOC, HCT, PHCT, RBCH, PLT, MCV, HGBEXT, HCTEXT, PLTEXT  No results found for: NA, K, CL, CO2, AGAP, GLU, BUN, CREA, BUCR, GFRAA, GFRNA, CA, GFRAA  No results found for: APTT, PTP, INR, INREXT  Lab Results   Component Value Date/Time    Glucose (POC) 143 (H) 06/01/2021 07:48 AM     Physical Exam    Airway  Mallampati: I  TM Distance: 4 - 6 cm  Neck ROM: normal range of motion   Mouth opening: Normal     Cardiovascular    Rhythm: regular  Rate: normal         Dental  No notable dental hx       Pulmonary  Breath sounds clear to auscultation               Abdominal  GI exam deferred       Other Findings            Anesthetic Plan    ASA: 4  Anesthesia type: total IV anesthesia and general          Induction: Intravenous  Anesthetic plan and risks discussed with: Patient and Family      General anesthesia was prescribed for this patient because by definition it is \"a drug-induced loss of consciousness during which patients are not arousable, even by painful stimulation. \" Sometimes, the ability to independently maintain ventilatory function is often impaired and patients often require assistance in maintaining a patent airway.  Occasionally, positive pressure ventilation may be required because of depressed spontaneous ventilation or drug-induced depression of neuromuscular function. This depth of anesthesia is preferred for endoscopic/esophageal procedures to facilitate the procedure and for patient safety/quality of care.

## 2021-06-08 NOTE — ANESTHESIA POSTPROCEDURE EVALUATION
Procedure(s):  COLONOSCOPY. general    Anesthesia Post Evaluation      Multimodal analgesia: multimodal analgesia not used between 6 hours prior to anesthesia start to PACU discharge  Patient location during evaluation: bedside (Endoscopy suite)  Patient participation: complete - patient cannot participate  Level of consciousness: sleepy but conscious  Pain score: 0  Pain management: adequate  Airway patency: patent  Anesthetic complications: no  Cardiovascular status: acceptable  Respiratory status: acceptable and nasal cannula  Hydration status: acceptable  Comments: This patient remained on the stretcher. The patient was handed off to the endoscopy nursing team.  All questions regarding pre-, intra-, and postoperative care were answered.   Post anesthesia nausea and vomiting:  none      INITIAL Post-op Vital signs:   Vitals Value Taken Time   /61 06/01/21 0914   Temp 36.6 °C (97.8 °F) 06/01/21 0902   Pulse 59 06/01/21 0914   Resp 18 06/01/21 0914   SpO2 100 % 06/01/21 0914

## 2021-08-05 RX ORDER — DILTIAZEM HYDROCHLORIDE 120 MG/1
TABLET, FILM COATED ORAL
Qty: 90 TABLET | Refills: 0 | Status: SHIPPED | OUTPATIENT
Start: 2021-08-05 | End: 2022-01-26 | Stop reason: SDUPTHER

## 2021-08-05 RX ORDER — METOPROLOL SUCCINATE 50 MG/1
TABLET, EXTENDED RELEASE ORAL
Qty: 90 TABLET | Refills: 0 | Status: SHIPPED | OUTPATIENT
Start: 2021-08-05 | End: 2022-01-26 | Stop reason: SDUPTHER

## 2021-08-10 ENCOUNTER — OFFICE VISIT (OUTPATIENT)
Dept: FAMILY MEDICINE CLINIC | Age: 71
End: 2021-08-10
Payer: MEDICARE

## 2021-08-10 VITALS
OXYGEN SATURATION: 96 % | HEIGHT: 67 IN | RESPIRATION RATE: 18 BRPM | DIASTOLIC BLOOD PRESSURE: 60 MMHG | TEMPERATURE: 97.3 F | WEIGHT: 185.8 LBS | HEART RATE: 51 BPM | BODY MASS INDEX: 29.16 KG/M2 | SYSTOLIC BLOOD PRESSURE: 142 MMHG

## 2021-08-10 DIAGNOSIS — F17.200 TOBACCO DEPENDENCE: ICD-10-CM

## 2021-08-10 DIAGNOSIS — E11.59 TYPE 2 DIABETES MELLITUS WITH OTHER CIRCULATORY COMPLICATION, WITH LONG-TERM CURRENT USE OF INSULIN (HCC): ICD-10-CM

## 2021-08-10 DIAGNOSIS — Z71.6 TOBACCO ABUSE COUNSELING: ICD-10-CM

## 2021-08-10 DIAGNOSIS — Z79.4 TYPE 2 DIABETES MELLITUS WITH OTHER CIRCULATORY COMPLICATION, WITH LONG-TERM CURRENT USE OF INSULIN (HCC): ICD-10-CM

## 2021-08-10 DIAGNOSIS — E78.2 MIXED HYPERLIPIDEMIA: ICD-10-CM

## 2021-08-10 DIAGNOSIS — E10.65 HYPERGLYCEMIA DUE TO TYPE 1 DIABETES MELLITUS (HCC): ICD-10-CM

## 2021-08-10 DIAGNOSIS — I25.2 HX OF MYOCARDIAL INFARCTION, GREATER THAN 8 WEEKS: ICD-10-CM

## 2021-08-10 DIAGNOSIS — K21.00 GASTROESOPHAGEAL REFLUX DISEASE WITH ESOPHAGITIS WITHOUT HEMORRHAGE: ICD-10-CM

## 2021-08-10 DIAGNOSIS — I11.9 MALIGNANT HYPERTENSIVE HEART DISEASE WITHOUT HEART FAILURE: Primary | ICD-10-CM

## 2021-08-10 LAB
GLUCOSE POC: 75 MG/DL
HBA1C MFR BLD HPLC: 9.5 %

## 2021-08-10 PROCEDURE — G8427 DOCREV CUR MEDS BY ELIG CLIN: HCPCS | Performed by: FAMILY MEDICINE

## 2021-08-10 PROCEDURE — G8753 SYS BP > OR = 140: HCPCS | Performed by: FAMILY MEDICINE

## 2021-08-10 PROCEDURE — G8417 CALC BMI ABV UP PARAM F/U: HCPCS | Performed by: FAMILY MEDICINE

## 2021-08-10 PROCEDURE — 2022F DILAT RTA XM EVC RTNOPTHY: CPT | Performed by: FAMILY MEDICINE

## 2021-08-10 PROCEDURE — 83036 HEMOGLOBIN GLYCOSYLATED A1C: CPT | Performed by: FAMILY MEDICINE

## 2021-08-10 PROCEDURE — 82947 ASSAY GLUCOSE BLOOD QUANT: CPT | Performed by: FAMILY MEDICINE

## 2021-08-10 PROCEDURE — 99214 OFFICE O/P EST MOD 30 MIN: CPT | Performed by: FAMILY MEDICINE

## 2021-08-10 PROCEDURE — 1101F PT FALLS ASSESS-DOCD LE1/YR: CPT | Performed by: FAMILY MEDICINE

## 2021-08-10 PROCEDURE — G8510 SCR DEP NEG, NO PLAN REQD: HCPCS | Performed by: FAMILY MEDICINE

## 2021-08-10 PROCEDURE — 3046F HEMOGLOBIN A1C LEVEL >9.0%: CPT | Performed by: FAMILY MEDICINE

## 2021-08-10 PROCEDURE — 3017F COLORECTAL CA SCREEN DOC REV: CPT | Performed by: FAMILY MEDICINE

## 2021-08-10 PROCEDURE — G8536 NO DOC ELDER MAL SCRN: HCPCS | Performed by: FAMILY MEDICINE

## 2021-08-10 PROCEDURE — G8754 DIAS BP LESS 90: HCPCS | Performed by: FAMILY MEDICINE

## 2021-08-10 NOTE — PROGRESS NOTES
Chief Complaint   Patient presents with    Follow Up Chronic Condition    Hypertension    Diabetes     1. Have you been to the ER, urgent care clinic since your last visit? Hospitalized since your last visit? No    2. Have you seen or consulted any other health care providers outside of the [address] [place] [address] since your last visit? Include any pap smears or colon screening.  No     Visit Vitals  BP (!) 142/60 (BP 1 Location: Right upper arm, BP Patient Position: Sitting, BP Cuff Size: Adult)   Pulse (!) 51   Temp 97.3 °F (36.3 °C) (Temporal)   Resp 18   Ht 5' 7\" (1.702 m)   Wt 185 lb 12.8 oz (84.3 kg)   SpO2 96% Comment: room air   BMI 29.10 kg/m²

## 2021-08-10 NOTE — PROGRESS NOTES
Dex Dubose is a 70 y.o. male and presents with Follow Up Chronic Condition, Hypertension, and Diabetes  . HPI   69 yo AAM with a hx of diabetes and HTN stating no current symptoms except chest pain and heartburn on eating some fatty meat that almost made him call 911 but was relieved by Marie Cazares and states no pain today. States reduced smoking less than a half pack of cigs a day now and working on quitting  Subjective:  Cardiovascular Review:  The patient has hypertension   Diet and Lifestyle: generally follows a low fat low cholesterol diet, generally follows a low sodium diet, exercises sporadically  Home BP Monitoring: is not measured at home. Pertinent ROS: taking medications as instructed, no medication side effects noted, no TIA's, no chest pain on exertion, no dyspnea on exertion, no swelling of ankles. Review of Systems  Review of Systems   Constitutional: Negative. Negative for chills and fever. HENT: Negative. Negative for congestion, ear discharge, hearing loss, nosebleeds and tinnitus. Eyes: Negative. Negative for blurred vision, double vision, photophobia and pain. Respiratory: Negative. Negative for cough, hemoptysis and sputum production. Cardiovascular: Negative. Negative for chest pain and palpitations. Gastrointestinal: Positive for heartburn. Negative for nausea and vomiting. Genitourinary: Negative. Negative for dysuria, frequency and urgency. Musculoskeletal: Negative. Negative for back pain and myalgias. Skin: Negative. Neurological: Negative. Negative for dizziness, tingling, weakness and headaches. Endo/Heme/Allergies: Negative. Psychiatric/Behavioral: Negative. Negative for depression and suicidal ideas. The patient does not have insomnia. All other systems reviewed and are negative.         Past Medical History:   Diagnosis Date    Abdominal pain 1/11/2021    Abscess of head, except face 1/11/2021    Automatic implantable cardiac defibrillator in situ 1/11/2021    Cholelithiasis 1/11/2021    Elevated blood pressure reading 1/11/2021    Epigastric pain 1/11/2021    Essential hypertension 1/11/2021    Gastroesophageal reflux disease 1/11/2021    History of adenomatous polyp of colon 1/11/2021    Hyperglycemia 1/11/2021    Hyperlipidemia 1/11/2021    Lesion of skin of scalp 1/11/2021    Myocardial infarction ([facility] [address] 2/11/6607    Systolic murmur 3/00/7756    Tobacco dependence 1/11/2021    Type II diabetes mellitus ([facility] [address] 1/11/2021     Past Surgical History:   Procedure Laterality Date    COLONOSCOPY N/A 6/1/2021    COLONOSCOPY performed by Mundo Wilson MD at [address] [place] [address] HX COLONOSCOPY      HX IMPLANTABLE CARDIOVERTER DEFIBRILLATOR      HX PROSTATE SURGERY       Social History     Socioeconomic History    Marital status:      Spouse name: Not on file    Number of children: Not on file    Years of education: Not on file    Highest education level: Not on file   Tobacco Use    Smoking status: Current Every Day Smoker     Packs/day: 0.25     Years: 20.00     Pack years: 5.00     Types: Cigarettes    Smokeless tobacco: Never Used   Vaping Use    Vaping Use: Never used   Substance and Sexual Activity    Alcohol use: Not Currently     Comment: occasionally    Drug use: Never     Social Determinants of Health     Financial Resource Strain:     Difficulty of Paying Living Expenses:    Food Insecurity:     Worried About Running Out of Food in the Last Year:     Ran Out of Food in the Last Year:    Transportation Needs:     Lack of Transportation (Medical):      Lack of Transportation (Non-Medical):    Physical Activity:     Days of Exercise per Week:     Minutes of Exercise per Session:    Stress:     Feeling of Stress :    Social Connections:     Frequency of Communication with Friends and Family:     Frequency of Social Gatherings with Friends and Family:     Attends Confucianism Services:     Active Member of Clubs or Organizations:     Attends Club or Organization Meetings:     Marital Status:      Family History   Problem Relation Age of Onset    No Known Problems Mother     No Known Problems Father      Current Outpatient Medications   Medication Sig Dispense Refill    dilTIAZem IR (CARDIZEM) 120 mg tablet take 1 tablet by mouth daily 90 Tablet 0    metoprolol succinate (TOPROL-XL) 50 mg XL tablet take 1 tablet by mouth daily 90 Tablet 0    aspirin delayed-release 81 mg tablet Take 81 mg by mouth daily.  atorvastatin (LIPITOR) 40 mg tablet Take 1 Tablet by mouth daily. 90 Tablet 1    PEG 3350-Electrolytes (Golytely) 236-22.74-6.74 -5.86 gram suspension Used as directed 1 Bottle 0    Blood-Glucose Meter (Accu-Chek Lyudmila Plus Meter) misc 1 Device by Does Not Apply route two (2) times a day. 1 Each 0    glucose blood VI test strips (Accu-Chek Lyudmila Plus test strp) strip Test blood sugar twice a day 100 Strip 1    Lancing Device with Lancets (Accu-Chek Soft Dev Lancets) kit 1 Device by Does Not Apply route two (2) times a day. 1 Kit 1    Blood Glucose Control High&Low (Accu-Chek Lyudmila Control Soln) soln 3 mL by Does Not Apply route as needed (as needed for controls). 1 Bottle 1    alcohol swabs (Alcohol Pads) padm 1 Pad by Apply Externally route two (2) times a day. 100 Pad 1    NovoLOG Mix 70-30FlexPen U-100 100 unit/mL (70-30) inpn 50 UNITS TWICE A DAY      empagliflozin (Jardiance) 10 mg tablet Take  by mouth daily.  lisinopriL (PRINIVIL, ZESTRIL) 10 mg tablet Take  by mouth daily.  metFORMIN (GLUCOPHAGE) 1,000 mg tablet Take 1,000 mg by mouth two (2) times daily (with meals).        No Known Allergies    Objective:  Visit Vitals  BP (!) 142/60 (BP 1 Location: Right upper arm, BP Patient Position: Sitting, BP Cuff Size: Adult)   Pulse (!) 51   Temp 97.3 °F (36.3 °C) (Temporal)   Resp 18   Ht 5' 7\" (1.702 m)   Wt 185 lb 12.8 oz (84.3 kg)   SpO2 96% Comment: room air   BMI 29.10 kg/m²       Physical Exam:   Physical Exam  Vitals and nursing note reviewed. Constitutional:       General: He is not in acute distress. Appearance: Normal appearance. He is obese. He is not ill-appearing, toxic-appearing or diaphoretic. HENT:      Head: Normocephalic and atraumatic. Right Ear: Tympanic membrane, ear canal and external ear normal. There is no impacted cerumen. Left Ear: Tympanic membrane, ear canal and external ear normal. There is no impacted cerumen. Nose: Nose normal. No congestion or rhinorrhea. Mouth/Throat:      Mouth: Mucous membranes are moist.      Pharynx: Oropharynx is clear. No oropharyngeal exudate or posterior oropharyngeal erythema. Eyes:      General: No scleral icterus. Right eye: No discharge. Left eye: No discharge. Extraocular Movements: Extraocular movements intact. Conjunctiva/sclera: Conjunctivae normal.      Pupils: Pupils are equal, round, and reactive to light. Neck:      Vascular: No carotid bruit. Cardiovascular:      Rate and Rhythm: Normal rate and regular rhythm. Pulses: Normal pulses. Heart sounds: Normal heart sounds. No murmur heard. No friction rub. No gallop. Pulmonary:      Effort: Pulmonary effort is normal. No respiratory distress. Breath sounds: Normal breath sounds. No stridor. No wheezing, rhonchi or rales. Chest:      Chest wall: No tenderness. Abdominal:      General: Abdomen is flat. Bowel sounds are normal. There is no distension. Palpations: Abdomen is soft. There is no mass. Tenderness: There is no abdominal tenderness. There is no right CVA tenderness, left CVA tenderness, guarding or rebound. Hernia: No hernia is present. Musculoskeletal:         General: No swelling, tenderness, deformity or signs of injury. Normal range of motion. Cervical back: Normal range of motion and neck supple. No rigidity. No muscular tenderness.       Right lower leg: No edema. Left lower leg: No edema. Lymphadenopathy:      Cervical: No cervical adenopathy. Skin:     General: Skin is warm. Capillary Refill: Capillary refill takes 2 to 3 seconds. Coloration: Skin is not jaundiced or pale. Findings: No bruising, erythema, lesion or rash. Neurological:      General: No focal deficit present. Mental Status: He is alert and oriented to person, place, and time. Mental status is at baseline. Cranial Nerves: No cranial nerve deficit. Sensory: No sensory deficit. Motor: No weakness. Coordination: Coordination normal.      Gait: Gait normal.      Deep Tendon Reflexes: Reflexes normal.   Psychiatric:         Mood and Affect: Mood normal.         Behavior: Behavior normal.         Thought Content: Thought content normal.         Judgment: Judgment normal.             Results for orders placed or performed during the hospital encounter of 06/01/21   GLUCOSE, POC   Result Value Ref Range    Glucose (POC) 143 (H) 65 - 117 mg/dL    Performed by Jessica Garland        Assessment/Plan:    ICD-10-CM ICD-9-CM    1. Malignant hypertensive heart disease without heart failure  I11.9 402.00 LIPID PANEL      METABOLIC PANEL, COMPREHENSIVE   2. Hx of myocardial infarction, greater than 8 weeks  I25.2 412    3. Gastroesophageal reflux disease with esophagitis without hemorrhage  K21.00 530.81      530.10    4. Tobacco dependence  F17.200 305.1    5. Tobacco abuse counseling  Z71.6 V65.42      305.1    6. Type 2 diabetes mellitus with other circulatory complication, with long-term current use of insulin (formerly [facility])  E11.59 250.70 LIPID PANEL    R36.2 J00.76 METABOLIC PANEL, COMPREHENSIVE      HEMOGLOBIN A1C WITH EAG      AMB POC GLUCOSE, QUANTITATIVE, BLOOD      AMB POC HEMOGLOBIN A1C   7.  Mixed hyperlipidemia  E78.2 272.2      Orders Placed This Encounter    LIPID PANEL    METABOLIC PANEL, COMPREHENSIVE    HEMOGLOBIN A1C WITH EAG    AMB POC GLUCOSE, QUANTITATIVE, BLOOD    AMB POC HEMOGLOBIN A1C     Cannot display discharge medications since this is not an admission.

## 2022-01-20 ENCOUNTER — TELEPHONE (OUTPATIENT)
Dept: FAMILY MEDICINE CLINIC | Age: 72
End: 2022-01-20

## 2022-01-20 ENCOUNTER — NURSE TRIAGE (OUTPATIENT)
Dept: OTHER | Facility: CLINIC | Age: 72
End: 2022-01-20

## 2022-01-20 NOTE — TELEPHONE ENCOUNTER
Received call from Johana Brock  at [facility], caller not on line. Complaint: Blood sugar of 24     [employer]: [employer] Name: [id number]     Caller's telephone number verified as [phone]    Connected with caller via phone, please see below triage       Subjective: Caller states \"I took my blood sugar this morning and it was 224- it dropped to 24 yesterday \"     Current Symptoms:   +paramedics came yesterday when it dropped - per pt \"was not taken to hospital\"   +pt takes metformin and Novolog   +\"I was not eating like I was supposed to\"   +loss of appetite the last 3 days   +pt lives with wife   -denies nausea, vomiting, frequent urination \" I feel fine really\"   +pt states he did eat breakfast today         Onset: 1 day ago; sudden    Associated Symptoms: reduced activity, reduced appetite, increased sleepiness, constipation    Pain Severity:Denies     Temperature: Denies     What has been tried: N/A     LMP: NA Pregnant: NA    Recommended disposition: Discuss with PCP and Callback by nurse  Today     Care advice provided, patient verbalizes understanding; denies any other questions or concerns; instructed to call back for any new or worsening symptoms. Writer provided warm transfer to [id number]  at [facility] for appointment scheduling- pt disconnected- Will call pt back to connect to office     Attention Provider: Thank you for allowing me to participate in the care of your patient. The patient was connected to triage in response to information provided to the [facility]. Please do not respond through this encounter as the response is not directed to a shared pool.     Reason for Disposition   Morning (before breakfast) blood glucose < 80 mg/dL (4.4 mmol/L) and more than once in past week    Protocols used: DIABETES - LOW BLOOD SUGAR-ADULT-OH

## 2022-01-20 NOTE — TELEPHONE ENCOUNTER
This nurse called the patient to see what she can help him with and he wanted to know when can he get an appt to see dr Phill Talavera. An appt was given for January 26, 2022 at 10:45 am patient stated he will see us then.

## 2022-01-26 ENCOUNTER — OFFICE VISIT (OUTPATIENT)
Dept: FAMILY MEDICINE CLINIC | Age: 72
End: 2022-01-26
Payer: MEDICARE

## 2022-01-26 VITALS
RESPIRATION RATE: 18 BRPM | DIASTOLIC BLOOD PRESSURE: 72 MMHG | WEIGHT: 190.2 LBS | HEART RATE: 79 BPM | BODY MASS INDEX: 29.85 KG/M2 | OXYGEN SATURATION: 99 % | SYSTOLIC BLOOD PRESSURE: 128 MMHG | TEMPERATURE: 97.3 F | HEIGHT: 67 IN

## 2022-01-26 DIAGNOSIS — Z71.6 TOBACCO ABUSE COUNSELING: ICD-10-CM

## 2022-01-26 DIAGNOSIS — I25.2 HX OF MYOCARDIAL INFARCTION, GREATER THAN 8 WEEKS: ICD-10-CM

## 2022-01-26 DIAGNOSIS — E78.2 MIXED HYPERLIPIDEMIA: ICD-10-CM

## 2022-01-26 DIAGNOSIS — K21.00 GASTROESOPHAGEAL REFLUX DISEASE WITH ESOPHAGITIS WITHOUT HEMORRHAGE: ICD-10-CM

## 2022-01-26 DIAGNOSIS — E10.65 HYPERGLYCEMIA DUE TO TYPE 1 DIABETES MELLITUS (HCC): ICD-10-CM

## 2022-01-26 DIAGNOSIS — Z86.010 HX OF COLONIC POLYP: ICD-10-CM

## 2022-01-26 DIAGNOSIS — Z23 ENCOUNTER FOR IMMUNIZATION: ICD-10-CM

## 2022-01-26 DIAGNOSIS — Z91.14 NONCOMPLIANCE WITH MEDICATION REGIMEN: ICD-10-CM

## 2022-01-26 DIAGNOSIS — E11.65 POORLY CONTROLLED DIABETES MELLITUS (HCC): ICD-10-CM

## 2022-01-26 DIAGNOSIS — F17.200 TOBACCO DEPENDENCE: ICD-10-CM

## 2022-01-26 DIAGNOSIS — I11.9 MALIGNANT HYPERTENSIVE HEART DISEASE WITHOUT HEART FAILURE: Primary | ICD-10-CM

## 2022-01-26 PROCEDURE — 1101F PT FALLS ASSESS-DOCD LE1/YR: CPT | Performed by: FAMILY MEDICINE

## 2022-01-26 PROCEDURE — G8510 SCR DEP NEG, NO PLAN REQD: HCPCS | Performed by: FAMILY MEDICINE

## 2022-01-26 PROCEDURE — 3017F COLORECTAL CA SCREEN DOC REV: CPT | Performed by: FAMILY MEDICINE

## 2022-01-26 PROCEDURE — 99214 OFFICE O/P EST MOD 30 MIN: CPT | Performed by: FAMILY MEDICINE

## 2022-01-26 PROCEDURE — 90694 VACC AIIV4 NO PRSRV 0.5ML IM: CPT | Performed by: FAMILY MEDICINE

## 2022-01-26 PROCEDURE — G0008 ADMIN INFLUENZA VIRUS VAC: HCPCS | Performed by: FAMILY MEDICINE

## 2022-01-26 PROCEDURE — G8427 DOCREV CUR MEDS BY ELIG CLIN: HCPCS | Performed by: FAMILY MEDICINE

## 2022-01-26 PROCEDURE — G8754 DIAS BP LESS 90: HCPCS | Performed by: FAMILY MEDICINE

## 2022-01-26 PROCEDURE — 2022F DILAT RTA XM EVC RTNOPTHY: CPT | Performed by: FAMILY MEDICINE

## 2022-01-26 PROCEDURE — G8752 SYS BP LESS 140: HCPCS | Performed by: FAMILY MEDICINE

## 2022-01-26 PROCEDURE — G8417 CALC BMI ABV UP PARAM F/U: HCPCS | Performed by: FAMILY MEDICINE

## 2022-01-26 PROCEDURE — G8536 NO DOC ELDER MAL SCRN: HCPCS | Performed by: FAMILY MEDICINE

## 2022-01-26 PROCEDURE — 3046F HEMOGLOBIN A1C LEVEL >9.0%: CPT | Performed by: FAMILY MEDICINE

## 2022-01-26 RX ORDER — DILTIAZEM HYDROCHLORIDE 120 MG/1
120 TABLET, FILM COATED ORAL DAILY
Qty: 90 TABLET | Refills: 0 | Status: SHIPPED | OUTPATIENT
Start: 2022-01-26 | End: 2022-04-21

## 2022-01-26 RX ORDER — ATORVASTATIN CALCIUM 40 MG/1
40 TABLET, FILM COATED ORAL DAILY
Qty: 90 TABLET | Refills: 1 | Status: SHIPPED | OUTPATIENT
Start: 2022-01-26 | End: 2022-08-06

## 2022-01-26 RX ORDER — PIOGLITAZONEHYDROCHLORIDE 45 MG/1
45 TABLET ORAL DAILY
COMMUNITY

## 2022-01-26 RX ORDER — METFORMIN HYDROCHLORIDE 1000 MG/1
1000 TABLET ORAL 2 TIMES DAILY WITH MEALS
Qty: 60 TABLET | Refills: 2 | Status: SHIPPED | OUTPATIENT
Start: 2022-01-26 | End: 2022-02-25

## 2022-01-26 RX ORDER — FAMOTIDINE 40 MG/1
40 TABLET, FILM COATED ORAL DAILY
Qty: 30 TABLET | Refills: 2 | Status: SHIPPED | OUTPATIENT
Start: 2022-01-26 | End: 2022-05-15

## 2022-01-26 RX ORDER — METOPROLOL SUCCINATE 50 MG/1
50 TABLET, EXTENDED RELEASE ORAL DAILY
Qty: 90 TABLET | Refills: 0 | Status: SHIPPED | OUTPATIENT
Start: 2022-01-26 | End: 2022-04-21

## 2022-01-26 NOTE — PROGRESS NOTES
1. Have you been to the ER, urgent care clinic since your last visit? Hospitalized since your last visit? No    2. Have you seen or consulted any other health care providers outside of the [address] [place] [address] since your last visit? Include any pap smears or colon screening. No     Chief Complaint   Patient presents with    Follow-up    Hypertension    Diabetes    Medication Refill       Visit Vitals  /72 (BP 1 Location: Right arm, BP Patient Position: Sitting, BP Cuff Size: Adult)   Pulse 79   Temp 97.3 °F (36.3 °C) (Temporal)   Resp 18   Ht 5' 7\" (1.702 m)   Wt 190 lb 3.2 oz (86.3 kg)   SpO2 99%   BMI 29.79 kg/m²       Patient is here for a f/u diabetes, hypertension, no labwork done. Needs meds refills. Glucose checked in the office 185  HGA1C checked in the office 10.7. Patient has not eaten or drank anything this morning. Last Wednesday the patients blood sugar dropped down to 24 and the rescue squad came and treated him at home. Patient did not go to the ER.

## 2022-01-26 NOTE — PROGRESS NOTES
Arlyn Ross is a 70 y.o. male and presents with Follow-up, Hypertension, Diabetes, and Medication Refill  . HPI   69 yo AAM with a hx of HTN and diabetes who states he takes his meds on and off and with checked med bottles showing last filled over 18 months ago  Pt also c/o heartburn and gas for over 1 week with intermittent abdominal pain  Subjective:  Cardiovascular Review:  The patient has hypertension   Diet and Lifestyle: generally follows a low fat low cholesterol diet, generally follows a low sodium diet, exercises sporadically  Home BP Monitoring: is not measured at home. Pertinent ROS: taking medications as instructed, no medication side effects noted, no TIA's, no chest pain on exertion, no dyspnea on exertion, no swelling of ankles. Review of Systems  Review of Systems   Constitutional: Negative. Negative for chills and fever. HENT: Negative. Negative for congestion, ear discharge, hearing loss, nosebleeds and tinnitus. Eyes: Negative. Negative for blurred vision, double vision, photophobia and pain. Respiratory: Negative. Negative for cough, hemoptysis and sputum production. Cardiovascular: Negative. Negative for chest pain and palpitations. Gastrointestinal: Positive for abdominal pain and heartburn. Negative for nausea and vomiting. Genitourinary: Negative. Negative for dysuria, frequency and urgency. Musculoskeletal: Negative. Negative for back pain and myalgias. Skin: Negative. Neurological: Negative. Negative for dizziness, tingling, weakness and headaches. Endo/Heme/Allergies: Negative. Psychiatric/Behavioral: Negative. Negative for depression and suicidal ideas. The patient does not have insomnia. All other systems reviewed and are negative.         Past Medical History:   Diagnosis Date    Abdominal pain 1/11/2021    Abscess of head, except face 1/11/2021    Automatic implantable cardiac defibrillator in situ 1/11/2021    Cholelithiasis 1/11/2021    Elevated blood pressure reading 1/11/2021    Epigastric pain 1/11/2021    Essential hypertension 1/11/2021    Gastroesophageal reflux disease 1/11/2021    History of adenomatous polyp of colon 1/11/2021    Hyperglycemia 1/11/2021    Hyperlipidemia 1/11/2021    Lesion of skin of scalp 1/11/2021    Myocardial infarction ([facility] 4/19/8032    Systolic murmur 6/74/9223    Tobacco dependence 1/11/2021    Type II diabetes mellitus ([facility] 1/11/2021     Past Surgical History:   Procedure Laterality Date    COLONOSCOPY N/A 6/1/2021    COLONOSCOPY performed by Brigid Neri MD at [facility] HX COLONOSCOPY      HX IMPLANTABLE CARDIOVERTER DEFIBRILLATOR      HX PROSTATE SURGERY       Social History     Socioeconomic History    Marital status:    Tobacco Use    Smoking status: Current Every Day Smoker     Packs/day: 0.25     Years: 20.00     Pack years: 5.00     Types: Cigarettes    Smokeless tobacco: Never Used   Vaping Use    Vaping Use: Never used   Substance and Sexual Activity    Alcohol use: Not Currently     Comment: occasionally    Drug use: Never     Family History   Problem Relation Age of Onset    No Known Problems Mother     No Known Problems Father      Current Outpatient Medications   Medication Sig Dispense Refill    pioglitazone (ACTOS) 45 mg tablet Take 45 mg by mouth daily.  SITagliptin (Januvia) 100 mg tablet Take 100 mg by mouth daily.  dilTIAZem IR (CARDIZEM) 120 mg tablet take 1 tablet by mouth daily 90 Tablet 0    metoprolol succinate (TOPROL-XL) 50 mg XL tablet take 1 tablet by mouth daily 90 Tablet 0    aspirin delayed-release 81 mg tablet Take 81 mg by mouth daily.  atorvastatin (LIPITOR) 40 mg tablet Take 1 Tablet by mouth daily. 90 Tablet 1    Blood-Glucose Meter (Accu-Chek Lyudmila Plus Meter) misc 1 Device by Does Not Apply route two (2) times a day.  1 Each 0    glucose blood VI test strips (Accu-Chek Lyudmila Plus test strp) strip Test blood sugar twice a day 100 Strip 1    Lancing Device with Lancets (Accu-Chek Soft Dev Lancets) kit 1 Device by Does Not Apply route two (2) times a day. 1 Kit 1    Blood Glucose Control High&Low (Accu-Chek Lyudmila Control Soln) soln 3 mL by Does Not Apply route as needed (as needed for controls). 1 Bottle 1    alcohol swabs (Alcohol Pads) padm 1 Pad by Apply Externally route two (2) times a day. 100 Pad 1    NovoLOG Mix 70-30FlexPen U-100 100 unit/mL (70-30) inpn 50 UNITS TWICE A DAY      lisinopriL (PRINIVIL, ZESTRIL) 10 mg tablet Take  by mouth daily.  metFORMIN (GLUCOPHAGE) 1,000 mg tablet Take 1,000 mg by mouth two (2) times daily (with meals).  PEG 3350-Electrolytes (Golytely) 236-22.74-6.74 -5.86 gram suspension Used as directed (Patient not taking: Reported on 1/26/2022) 1 Bottle 0    empagliflozin (Jardiance) 10 mg tablet Take  by mouth daily. (Patient not taking: Reported on 1/26/2022)       No Known Allergies    Objective:  Visit Vitals  /72 (BP 1 Location: Right arm, BP Patient Position: Sitting, BP Cuff Size: Adult)   Pulse 79   Temp 97.3 °F (36.3 °C) (Temporal)   Resp 18   Ht 5' 7\" (1.702 m)   Wt 190 lb 3.2 oz (86.3 kg)   SpO2 99%   BMI 29.79 kg/m²       Physical Exam:   Physical Exam  Vitals and nursing note reviewed. Constitutional:       General: He is not in acute distress. Appearance: Normal appearance. He is obese. He is not ill-appearing, toxic-appearing or diaphoretic. HENT:      Head: Normocephalic and atraumatic. Right Ear: Tympanic membrane, ear canal and external ear normal. There is no impacted cerumen. Left Ear: Tympanic membrane, ear canal and external ear normal. There is no impacted cerumen. Nose: Nose normal. No congestion or rhinorrhea. Mouth/Throat:      Mouth: Mucous membranes are moist.      Pharynx: Oropharynx is clear. No oropharyngeal exudate or posterior oropharyngeal erythema. Eyes:      General: No scleral icterus. Right eye: No discharge. Left eye: No discharge. Extraocular Movements: Extraocular movements intact. Conjunctiva/sclera: Conjunctivae normal.      Pupils: Pupils are equal, round, and reactive to light. Neck:      Vascular: No carotid bruit. Cardiovascular:      Rate and Rhythm: Normal rate and regular rhythm. Pulses: Normal pulses. Heart sounds: Normal heart sounds. No murmur heard. No friction rub. No gallop. Pulmonary:      Effort: Pulmonary effort is normal. No respiratory distress. Breath sounds: Normal breath sounds. No stridor. No wheezing, rhonchi or rales. Chest:      Chest wall: No tenderness. Abdominal:      General: Abdomen is flat. Bowel sounds are normal. There is no distension. Palpations: Abdomen is soft. There is no mass. Tenderness: There is no abdominal tenderness. There is no right CVA tenderness, left CVA tenderness, guarding or rebound. Hernia: No hernia is present. Musculoskeletal:         General: No swelling, tenderness, deformity or signs of injury. Normal range of motion. Cervical back: Normal range of motion and neck supple. No rigidity. No muscular tenderness. Right lower leg: No edema. Left lower leg: No edema. Lymphadenopathy:      Cervical: No cervical adenopathy. Skin:     General: Skin is warm. Capillary Refill: Capillary refill takes 2 to 3 seconds. Coloration: Skin is not jaundiced or pale. Findings: No bruising, erythema, lesion or rash. Neurological:      General: No focal deficit present. Mental Status: He is alert and oriented to person, place, and time. Mental status is at baseline. Cranial Nerves: No cranial nerve deficit. Sensory: No sensory deficit. Motor: No weakness.       Coordination: Coordination normal.      Gait: Gait normal.      Deep Tendon Reflexes: Reflexes normal.   Psychiatric:         Mood and Affect: Mood normal.         Behavior: Behavior normal.         Thought Content: Thought content normal.         Judgment: Judgment normal.             Results for orders placed or performed in visit on 08/10/21   AMB POC GLUCOSE, QUANTITATIVE, BLOOD   Result Value Ref Range    Glucose POC 75 MG/DL   AMB POC HEMOGLOBIN A1C   Result Value Ref Range    Hemoglobin A1c (POC) 9.5 %       Assessment/Plan:    ICD-10-CM ICD-9-CM    1. Malignant hypertensive heart disease without heart failure  J03.8 142.16 METABOLIC PANEL, COMPREHENSIVE   2. Hx of myocardial infarction, greater than 8 weeks  I25.2 412    3. Hyperglycemia due to type 1 diabetes mellitus (HCC)  S22.19 787.09 METABOLIC PANEL, COMPREHENSIVE      LIPID PANEL      HEMOGLOBIN A1C WITH EAG    start tradjenta 5mg daily   4. Poorly controlled diabetes mellitus ([facility] [address]  N63.51 852.02 METABOLIC PANEL, COMPREHENSIVE      LIPID PANEL      HEMOGLOBIN A1C WITH EAG    restart Actos 45mg daily and Metformin-pt noncompliant and last filled these in over 18 months   5. Tobacco dependence  F17.200 305.1    6. Tobacco abuse counseling  Z71.6 V65.42      305.1    7. Mixed hyperlipidemia  E78.2 272.2 LIPID PANEL   8. Noncompliance with medication regimen  Z91.14 V15.81    9. Gastroesophageal reflux disease with esophagitis without hemorrhage  K21.00 530.81      530.10     start famotidine 40mg daily   10. Hx of colonic polyp  Z86.010 V12.72     next colonoscopy in 2024   11. Encounter for immunization  Z23 V03.89 FLU (FLUAD QUAD INFLUENZA VACCINE,QUAD,ADJUVANTED)     Orders Placed This Encounter    pioglitazone (ACTOS) 45 mg tablet     Sig: Take 45 mg by mouth daily.  SITagliptin (Januvia) 100 mg tablet     Sig: Take 100 mg by mouth daily. Cannot display discharge medications since this is not an admission.

## 2022-02-25 PROBLEM — Z23 ENCOUNTER FOR IMMUNIZATION: Status: RESOLVED | Noted: 2022-01-26 | Resolved: 2022-02-25

## 2022-03-18 PROBLEM — R10.9 ABDOMINAL PAIN: Status: ACTIVE | Noted: 2021-01-11

## 2022-03-18 PROBLEM — K21.9 GASTROESOPHAGEAL REFLUX DISEASE: Status: ACTIVE | Noted: 2021-01-11

## 2022-03-18 PROBLEM — Z71.6 TOBACCO ABUSE COUNSELING: Status: ACTIVE | Noted: 2021-01-11

## 2022-03-19 PROBLEM — I25.2 HX OF MYOCARDIAL INFARCTION, GREATER THAN 8 WEEKS: Status: ACTIVE | Noted: 2021-01-11

## 2022-03-19 PROBLEM — Z91.148 NONCOMPLIANCE WITH MEDICATION REGIMEN: Status: ACTIVE | Noted: 2022-01-26

## 2022-03-19 PROBLEM — I21.9 MYOCARDIAL INFARCTION (HCC): Status: ACTIVE | Noted: 2021-01-11

## 2022-03-19 PROBLEM — I10 ESSENTIAL HYPERTENSION: Status: ACTIVE | Noted: 2021-01-11

## 2022-03-19 PROBLEM — E11.65 POORLY CONTROLLED DIABETES MELLITUS (HCC): Status: ACTIVE | Noted: 2021-04-12

## 2022-03-19 PROBLEM — K80.20 CHOLELITHIASIS: Status: ACTIVE | Noted: 2021-01-11

## 2022-03-19 PROBLEM — R10.13 EPIGASTRIC PAIN: Status: ACTIVE | Noted: 2021-01-11

## 2022-03-19 PROBLEM — Z86.010 HX OF COLONIC POLYP: Status: ACTIVE | Noted: 2022-01-26

## 2022-03-19 PROBLEM — Z95.810 AUTOMATIC IMPLANTABLE CARDIAC DEFIBRILLATOR IN SITU: Status: ACTIVE | Noted: 2021-01-11

## 2022-03-19 PROBLEM — E78.5 HYPERLIPIDEMIA: Status: ACTIVE | Noted: 2021-01-11

## 2022-03-19 PROBLEM — L98.9 LESION OF SKIN OF SCALP: Status: ACTIVE | Noted: 2021-01-11

## 2022-03-19 PROBLEM — Z86.0101 HISTORY OF ADENOMATOUS POLYP OF COLON: Status: ACTIVE | Noted: 2021-01-11

## 2022-03-19 PROBLEM — Z86.010 HISTORY OF ADENOMATOUS POLYP OF COLON: Status: ACTIVE | Noted: 2021-01-11

## 2022-03-19 PROBLEM — Z86.0100 HX OF COLONIC POLYP: Status: ACTIVE | Noted: 2022-01-26

## 2022-03-19 PROBLEM — F17.200 TOBACCO DEPENDENCE: Status: ACTIVE | Noted: 2021-01-11

## 2022-03-19 PROBLEM — R03.0 ELEVATED BLOOD PRESSURE READING: Status: ACTIVE | Noted: 2021-01-11

## 2022-03-19 PROBLEM — R01.1 SYSTOLIC MURMUR: Status: ACTIVE | Noted: 2021-01-11

## 2022-03-19 PROBLEM — R73.9 HYPERGLYCEMIA: Status: ACTIVE | Noted: 2021-01-11

## 2022-03-19 PROBLEM — I11.9 MALIGNANT HYPERTENSIVE HEART DISEASE WITHOUT HEART FAILURE: Status: ACTIVE | Noted: 2021-01-11

## 2022-03-20 PROBLEM — L02.811 ABSCESS OF HEAD, EXCEPT FACE: Status: ACTIVE | Noted: 2021-01-11

## 2022-03-20 PROBLEM — E10.65 HYPERGLYCEMIA DUE TO TYPE 1 DIABETES MELLITUS (HCC): Status: ACTIVE | Noted: 2021-08-10

## 2022-03-20 PROBLEM — E11.9 TYPE II DIABETES MELLITUS (HCC): Status: ACTIVE | Noted: 2021-01-11

## 2022-04-20 ENCOUNTER — OFFICE VISIT (OUTPATIENT)
Dept: FAMILY MEDICINE CLINIC | Age: 72
End: 2022-04-20
Payer: MEDICARE

## 2022-04-20 VITALS
HEIGHT: 67 IN | DIASTOLIC BLOOD PRESSURE: 76 MMHG | WEIGHT: 182 LBS | HEART RATE: 74 BPM | OXYGEN SATURATION: 98 % | BODY MASS INDEX: 28.56 KG/M2 | SYSTOLIC BLOOD PRESSURE: 122 MMHG | TEMPERATURE: 97.4 F | RESPIRATION RATE: 18 BRPM

## 2022-04-20 DIAGNOSIS — E11.59 TYPE 2 DIABETES MELLITUS WITH OTHER CIRCULATORY COMPLICATION, WITH LONG-TERM CURRENT USE OF INSULIN (HCC): Primary | ICD-10-CM

## 2022-04-20 DIAGNOSIS — I25.2 HX OF MYOCARDIAL INFARCTION, GREATER THAN 8 WEEKS: ICD-10-CM

## 2022-04-20 DIAGNOSIS — E10.65 HYPERGLYCEMIA DUE TO TYPE 1 DIABETES MELLITUS (HCC): ICD-10-CM

## 2022-04-20 DIAGNOSIS — Z79.4 TYPE 2 DIABETES MELLITUS WITH OTHER CIRCULATORY COMPLICATION, WITH LONG-TERM CURRENT USE OF INSULIN (HCC): Primary | ICD-10-CM

## 2022-04-20 DIAGNOSIS — Z91.199 NONCOMPLIANCE WITH DIABETES TREATMENT: ICD-10-CM

## 2022-04-20 DIAGNOSIS — F17.200 TOBACCO DEPENDENCE: ICD-10-CM

## 2022-04-20 DIAGNOSIS — I11.9 MALIGNANT HYPERTENSIVE HEART DISEASE WITHOUT HEART FAILURE: ICD-10-CM

## 2022-04-20 DIAGNOSIS — R73.9 HYPERGLYCEMIA: ICD-10-CM

## 2022-04-20 DIAGNOSIS — Z71.6 TOBACCO ABUSE COUNSELING: ICD-10-CM

## 2022-04-20 DIAGNOSIS — Z86.010 HISTORY OF ADENOMATOUS POLYP OF COLON: ICD-10-CM

## 2022-04-20 DIAGNOSIS — E11.65 POORLY CONTROLLED DIABETES MELLITUS (HCC): ICD-10-CM

## 2022-04-20 LAB — HBA1C MFR BLD HPLC: 9.7 %

## 2022-04-20 PROCEDURE — G8419 CALC BMI OUT NRM PARAM NOF/U: HCPCS | Performed by: FAMILY MEDICINE

## 2022-04-20 PROCEDURE — G8427 DOCREV CUR MEDS BY ELIG CLIN: HCPCS | Performed by: FAMILY MEDICINE

## 2022-04-20 PROCEDURE — 3017F COLORECTAL CA SCREEN DOC REV: CPT | Performed by: FAMILY MEDICINE

## 2022-04-20 PROCEDURE — G8510 SCR DEP NEG, NO PLAN REQD: HCPCS | Performed by: FAMILY MEDICINE

## 2022-04-20 PROCEDURE — G8536 NO DOC ELDER MAL SCRN: HCPCS | Performed by: FAMILY MEDICINE

## 2022-04-20 PROCEDURE — G8754 DIAS BP LESS 90: HCPCS | Performed by: FAMILY MEDICINE

## 2022-04-20 PROCEDURE — 2022F DILAT RTA XM EVC RTNOPTHY: CPT | Performed by: FAMILY MEDICINE

## 2022-04-20 PROCEDURE — 83036 HEMOGLOBIN GLYCOSYLATED A1C: CPT | Performed by: FAMILY MEDICINE

## 2022-04-20 PROCEDURE — G8752 SYS BP LESS 140: HCPCS | Performed by: FAMILY MEDICINE

## 2022-04-20 PROCEDURE — 99214 OFFICE O/P EST MOD 30 MIN: CPT | Performed by: FAMILY MEDICINE

## 2022-04-20 PROCEDURE — 3046F HEMOGLOBIN A1C LEVEL >9.0%: CPT | Performed by: FAMILY MEDICINE

## 2022-04-20 PROCEDURE — 1101F PT FALLS ASSESS-DOCD LE1/YR: CPT | Performed by: FAMILY MEDICINE

## 2022-04-20 NOTE — PROGRESS NOTES
1. \"Have you been to the ER, urgent care clinic since your last visit? Hospitalized since your last visit? \" No    2. \"Have you seen or consulted any other health care providers outside of the [address] [place] [address] since your last visit? \" No     3. For patients aged 39-70: Has the patient had a colonoscopy / FIT/ Cologuard? Yes - Care Gap present. Most recent result on file      If the patient is female:    4. For patients aged 41-77: Has the patient had a mammogram within the past 2 years? NA - based on age or sex      11. For patients aged 21-65: Has the patient had a pap smear?  NA - based on age or sex     Chief Complaint   Patient presents with    Follow-up    Diabetes    Hypertension       Visit Vitals  /76 (BP 1 Location: Right arm, BP Patient Position: Sitting, BP Cuff Size: Adult)   Pulse 74   Temp 97.4 °F (36.3 °C) (Temporal)   Resp 18   Ht 5' 7\" (1.702 m)   Wt 182 lb (82.6 kg)   SpO2 98%   BMI 28.51 kg/m²       Patient is here for a f/u hypertension and diabetes

## 2022-04-20 NOTE — PROGRESS NOTES
Dea Jama is a 67 y.o. male and presents with Follow-up, Diabetes, and Hypertension  . HPI   68 yo AAM with a hx of diabetes poorly controlled due to noncompliance in patient who states he skips his medications 2 to 3 times a week in patient with whom the impact of not taking meds  Have been discussed at previous visits  Subjective:  Cardiovascular Review:  The patient has hypertension   Diet and Lifestyle: generally follows a low fat low cholesterol diet, generally follows a low sodium diet, exercises sporadically  Home BP Monitoring: is not measured at home. Pertinent ROS: taking medications as instructed, no medication side effects noted, no TIA's, no chest pain on exertion, no dyspnea on exertion, no swelling of ankles. Review of Systems  Review of Systems   Constitutional: Negative. Negative for chills and fever. HENT: Negative. Negative for congestion, ear discharge, hearing loss, nosebleeds and tinnitus. Eyes: Negative. Negative for blurred vision, double vision, photophobia and pain. Respiratory: Negative. Negative for cough, hemoptysis and sputum production. Cardiovascular: Negative. Negative for chest pain and palpitations. Gastrointestinal: Negative. Negative for heartburn, nausea and vomiting. Genitourinary: Negative. Negative for dysuria, frequency and urgency. Musculoskeletal: Negative. Negative for back pain and myalgias. Skin: Negative. Neurological: Negative. Negative for dizziness, tingling, weakness and headaches. Endo/Heme/Allergies: Negative. Psychiatric/Behavioral: Negative. Negative for depression and suicidal ideas. The patient does not have insomnia. All other systems reviewed and are negative.         Past Medical History:   Diagnosis Date    Abdominal pain 1/11/2021    Abscess of head, except face 1/11/2021    Automatic implantable cardiac defibrillator in situ 1/11/2021    Cholelithiasis 1/11/2021    Elevated blood pressure reading 1/11/2021    Epigastric pain 1/11/2021    Essential hypertension 1/11/2021    Gastroesophageal reflux disease 1/11/2021    History of adenomatous polyp of colon 1/11/2021    Hyperglycemia 1/11/2021    Hyperlipidemia 1/11/2021    Lesion of skin of scalp 1/11/2021    Myocardial infarction ([facility] [address] 9/97/2003    Systolic murmur 9/28/4879    Tobacco dependence 1/11/2021    Type II diabetes mellitus ([facility] [address] 1/11/2021     Past Surgical History:   Procedure Laterality Date    COLONOSCOPY N/A 6/1/2021    COLONOSCOPY performed by Meghana Keith MD at [address] [facility] HX COLONOSCOPY      HX IMPLANTABLE CARDIOVERTER DEFIBRILLATOR      HX PROSTATE SURGERY       Social History     Socioeconomic History    Marital status:    Tobacco Use    Smoking status: Current Every Day Smoker     Packs/day: 0.25     Years: 20.00     Pack years: 5.00     Types: Cigarettes    Smokeless tobacco: Never Used   Vaping Use    Vaping Use: Never used   Substance and Sexual Activity    Alcohol use: Not Currently     Comment: occasionally    Drug use: Never     Family History   Problem Relation Age of Onset    No Known Problems Mother     No Known Problems Father      Current Outpatient Medications   Medication Sig Dispense Refill    pioglitazone (ACTOS) 45 mg tablet Take 45 mg by mouth daily.  SITagliptin (Januvia) 100 mg tablet Take 100 mg by mouth daily.  atorvastatin (LIPITOR) 40 mg tablet Take 1 Tablet by mouth daily. 90 Tablet 1    dilTIAZem IR (CARDIZEM) 120 mg tablet Take 1 Tablet by mouth daily. 90 Tablet 0    metoprolol succinate (TOPROL-XL) 50 mg XL tablet Take 1 Tablet by mouth daily. 90 Tablet 0    aspirin delayed-release 81 mg tablet Take 81 mg by mouth daily.  Blood-Glucose Meter (Accu-Chek Lyudmila Plus Meter) misc 1 Device by Does Not Apply route two (2) times a day.  1 Each 0    glucose blood VI test strips (Accu-Chek Lyudmila Plus test strp) strip Test blood sugar twice a day 100 Strip 1  Lancing Device with Lancets (Accu-Chek Soft Dev Lancets) kit 1 Device by Does Not Apply route two (2) times a day. 1 Kit 1    Blood Glucose Control High&Low (Accu-Chek Lyudmila Control Soln) soln 3 mL by Does Not Apply route as needed (as needed for controls). 1 Bottle 1    alcohol swabs (Alcohol Pads) padm 1 Pad by Apply Externally route two (2) times a day. 100 Pad 1    NovoLOG Mix 70-30FlexPen U-100 100 unit/mL (70-30) inpn 50 UNITS TWICE A DAY      lisinopriL (PRINIVIL, ZESTRIL) 10 mg tablet Take  by mouth daily.  PEG 3350-Electrolytes (Golytely) 236-22.74-6.74 -5.86 gram suspension Used as directed (Patient not taking: Reported on 1/26/2022) 1 Bottle 0    empagliflozin (Jardiance) 10 mg tablet Take  by mouth daily. (Patient not taking: Reported on 1/26/2022)       No Known Allergies    Objective:  Visit Vitals  /76 (BP 1 Location: Right arm, BP Patient Position: Sitting, BP Cuff Size: Adult)   Pulse 74   Temp 97.4 °F (36.3 °C) (Temporal)   Resp 18   Ht 5' 7\" (1.702 m)   Wt 182 lb (82.6 kg)   SpO2 98%   BMI 28.51 kg/m²       Physical Exam:   Physical Exam  Vitals and nursing note reviewed. Constitutional:       General: He is not in acute distress. Appearance: Normal appearance. He is obese. He is not ill-appearing, toxic-appearing or diaphoretic. HENT:      Head: Normocephalic and atraumatic. Right Ear: Tympanic membrane, ear canal and external ear normal. There is no impacted cerumen. Left Ear: Tympanic membrane, ear canal and external ear normal. There is no impacted cerumen. Nose: Nose normal. No congestion or rhinorrhea. Mouth/Throat:      Mouth: Mucous membranes are moist.      Pharynx: Oropharynx is clear. No oropharyngeal exudate or posterior oropharyngeal erythema. Eyes:      General: No scleral icterus. Right eye: No discharge. Left eye: No discharge. Extraocular Movements: Extraocular movements intact.       Conjunctiva/sclera: Conjunctivae normal.      Pupils: Pupils are equal, round, and reactive to light. Neck:      Vascular: No carotid bruit. Cardiovascular:      Rate and Rhythm: Normal rate and regular rhythm. Pulses: Normal pulses. Heart sounds: Normal heart sounds. No murmur heard. No friction rub. No gallop. Pulmonary:      Effort: Pulmonary effort is normal. No respiratory distress. Breath sounds: Normal breath sounds. No stridor. No wheezing, rhonchi or rales. Chest:      Chest wall: No tenderness. Abdominal:      General: Abdomen is flat. Bowel sounds are normal. There is no distension. Palpations: Abdomen is soft. There is no mass. Tenderness: There is no abdominal tenderness. There is no right CVA tenderness, left CVA tenderness, guarding or rebound. Hernia: No hernia is present. Musculoskeletal:         General: No swelling, tenderness, deformity or signs of injury. Normal range of motion. Cervical back: Normal range of motion and neck supple. No rigidity. No muscular tenderness. Right lower leg: No edema. Left lower leg: No edema. Lymphadenopathy:      Cervical: No cervical adenopathy. Skin:     General: Skin is warm. Capillary Refill: Capillary refill takes 2 to 3 seconds. Coloration: Skin is not jaundiced or pale. Findings: No bruising, erythema, lesion or rash. Neurological:      General: No focal deficit present. Mental Status: He is alert and oriented to person, place, and time. Mental status is at baseline. Cranial Nerves: No cranial nerve deficit. Sensory: No sensory deficit. Motor: No weakness. Coordination: Coordination normal.      Gait: Gait normal.      Deep Tendon Reflexes: Reflexes normal.   Psychiatric:         Mood and Affect: Mood normal.         Behavior: Behavior normal.         Thought Content:  Thought content normal.         Judgment: Judgment normal.             Results for orders placed or performed in visit on 04/20/22   AMB POC HEMOGLOBIN A1C   Result Value Ref Range    Hemoglobin A1c (POC) 9.7 %       Assessment/Plan:    ICD-10-CM ICD-9-CM    1. Type 2 diabetes mellitus with other circulatory complication, with long-term current use of insulin ([facility])  E11.59 250.70 AMB POC HEMOGLOBIN A1C    Z79.4 V58.67 REFERRAL TO DIABETIC EDUCATION      REFERRAL TO ENDOCRINOLOGY   2. Poorly controlled diabetes mellitus ([facility]  E11.65 250.00 REFERRAL TO DIABETIC EDUCATION      REFERRAL TO ENDOCRINOLOGY   3. Hyperglycemia due to type 1 diabetes mellitus ([facility]  E10.65 250.01 REFERRAL TO DIABETIC EDUCATION      REFERRAL TO ENDOCRINOLOGY   4. Malignant hypertensive heart disease without heart failure  I11.9 402.00    5. Hx of myocardial infarction, greater than 8 weeks  I25.2 412    6. History of adenomatous polyp of colon  Z86.010 V12.72    7. Hyperglycemia  R73.9 790.29    8. Tobacco dependence  F17.200 305.1    9. Tobacco abuse counseling  Z71.6 V65.42      305.1    10. Noncompliance with diabetes treatment  Z91.19 V15.81      Orders Placed This Encounter    AMB POC HEMOGLOBIN A1C     Cannot display discharge medications since this is not an admission.

## 2022-04-21 RX ORDER — DILTIAZEM HYDROCHLORIDE 120 MG/1
TABLET, FILM COATED ORAL
Qty: 90 TABLET | Refills: 0 | Status: SHIPPED | OUTPATIENT
Start: 2022-04-21 | End: 2022-08-06

## 2022-04-21 RX ORDER — METOPROLOL SUCCINATE 50 MG/1
TABLET, EXTENDED RELEASE ORAL
Qty: 90 TABLET | Refills: 0 | Status: SHIPPED | OUTPATIENT
Start: 2022-04-21 | End: 2022-08-06

## 2022-05-08 RX ORDER — LINAGLIPTIN 5 MG/1
TABLET, FILM COATED ORAL
Qty: 90 TABLET | Refills: 0 | Status: SHIPPED | OUTPATIENT
Start: 2022-05-08 | End: 2022-07-20 | Stop reason: SDUPTHER

## 2022-05-15 RX ORDER — FAMOTIDINE 40 MG/1
TABLET, FILM COATED ORAL
Qty: 30 TABLET | Refills: 2 | Status: SHIPPED | OUTPATIENT
Start: 2022-05-15

## 2022-05-20 ENCOUNTER — CLINICAL SUPPORT (OUTPATIENT)
Dept: DIABETES SERVICES | Age: 72
End: 2022-05-20
Payer: MEDICARE

## 2022-05-20 DIAGNOSIS — E11.59 TYPE 2 DIABETES MELLITUS WITH OTHER CIRCULATORY COMPLICATION, WITH LONG-TERM CURRENT USE OF INSULIN (HCC): Primary | ICD-10-CM

## 2022-05-20 DIAGNOSIS — Z79.4 TYPE 2 DIABETES MELLITUS WITH OTHER CIRCULATORY COMPLICATION, WITH LONG-TERM CURRENT USE OF INSULIN (HCC): Primary | ICD-10-CM

## 2022-05-20 PROCEDURE — G0108 DIAB MANAGE TRN  PER INDIV: HCPCS

## 2022-05-20 NOTE — PROGRESS NOTES
[employer] [facility]  Diabetes Self-Management Education & Support Program    Reason for Referral: DSMES  Referral Source: Simona Aguilera MD  Services requested: DSMES       ASSESSMENT    From my perspective, the participant would benefit from [facility] specifically related to reducing risks, healthy eating, monitoring, taking medications, physical activity, healthy coping and problem solving. Will adapt DSMES program to build on participant's skills score, confidence score and preparedness score as noted in the Diabetes Skills, Confidence, and Preparedness Index. During the program, we will focus on providing DSMES that specifically addresses participant's interest in reducing risks, healthy eating and physical activity, as shown by their reported readiness to change. The participant would be best served by attending weekly group class series. Diabetes Self-Management Education Follow-up Visit: June 1, 2022       Clinical Presentation  Attila Walsh is a 67 y.o.  male referred for diabetes self-management education. Participant has Type 2 DM on insulin for 11-20 years. Family history positivefor diabetes. Patient reports not receiving DSMES services in the past. Most recent A1c value:   Lab Results   Component Value Date/Time    Hemoglobin A1c (POC) 9.7 04/20/2022 10:30 AM       Diabetes-related medical history:  Macrovascular disease  myocardial infarction           Diabetes-related medications:  Current dosing:   Key Antihyperglycemic Medications             Tradjenta 5 mg tablet take 1 tablet by mouth once daily    pioglitazone (ACTOS) 45 mg tablet Take 45 mg by mouth daily. SITagliptin (Januvia) 100 mg tablet Take 100 mg by mouth daily. NovoLOG Mix 70-30FlexPen U-100 100 unit/mL (70-30) inpn 50 UNITS TWICE A DAY    empagliflozin (Jardiance) 10 mg tablet Take  by mouth daily.           Blood Pressure Management  Key ACE/ARB Medications             lisinopriL (PRINIVIL, ZESTRIL) 10 mg tablet Take  by mouth daily. Lipid Management  Key Antihyperlipidemia Meds             atorvastatin (LIPITOR) 40 mg tablet Take 1 Tablet by mouth daily. Clot Prevention  Key Anti-Platelet Anticoagulant Meds             aspirin delayed-release 81 mg tablet Take 81 mg by mouth daily. Learning Assessment  Learning objectives Educator assessment (5/20/2022)   Diabetes Disease Process  The participant can   A) describe diabetes in basic terms;   B) state the type of diabetes they have; &   C) state accepted blood glucose targets. Healthy Eating  The participant can   A) identify carbohydrate foods; &   B) accurately read food labels. Being Active  The participant can  A) state the benefits of physical activity;  B) report their current PA practices;  C) identify PA they would consider incorporating in their lives; &  D) develop an implementation plan. Monitoring  The participant can  A) operate their blood glucose meter; &  B) describe how they log their blood glucoses to share with their provider. Taking Medications  The participant can  A) name their diabetes medications;  B) state the purpose and dose;  C) note side effects; &  D) describe proper storage, disposal & transport (if appropriate). Healthy Coping  The participant can    A) describe their response to diabetes diagnosis; B) describe their specific coping mechanisms;  C) identify supportive people and/or other resources that positively support their diabetes self-care and health. Reducing Risks  The participant can describe the preventive measures used by providers to promote health and prevent diabetes complications. Problem Solving  The participant can   A) identify signs, symptoms & treatment of hypoglycemia;    B) identify signs, symptoms & treatment of hyperglycemia;  C) describe their sick day plan; &  D) identify BG patterns to discuss with their provider. No  Yes  No        No  No        Yes  No  Yes  Yes        Yes  No        Yes  No  No  No        Yes  No  Yes        No          Yes  Yes  No  No     Characteristics to Learning   Barriers to Learning      None     Favorite Ways to Learn   [] Lecture  [] Slides  [] Reading [] Video-Internet  [] Cassettes/CDs/MP3's  [x] Interactive Small Groups [] Other       Behavioral Assessment  Current self-care practices  Educator assessment (5/20/2022)   Healthy Eating   Current practices    24-hour Dietary Recall:  Breakfast: None  Lunch: Chicken feet, lemonade  Dinner: Does not remember  Snacks: None  Beverages: lemonade, Pepsi, water  Alcohol: None       Would benefit from DSMES related to Healthy Eating Yes    Eats a carbohydrate controlled diet: No    Stage of change: Preparation      Being Active  Current practices  How many days during the past week have you performed physical activity where your heart beats faster and your breathing is harder than normal for 30 minutes or more? 3 day(s)    How many days in a typical week do you perform activity such as this?  5 day(s)     Would benefit from [facility] related to Being Active:Yes      Exercises 150 minutes/week: No      Stage of change: Preparation     Monitoring  Current practices  Do you monitor your blood sugar? No    How often do you monitor? < 1x/day      Do you know your last A1c measurement? No    Do you know the meaning of the A1c? No     Would benefit from [facility] related to Monitoring: Yes      Uses BG readings to establish trends and understand BG patterns: No      Stage of change: Preparation       Taking Medication  Current practices  Do you understand what your diabetes medications do? Yes    How often do you miss doses of your diabetes medications? 1-3 times/week    Can you afford your diabetes medications?  Yes   Would benefit from [facility] related to Taking Medication: Yes      Takes medications consistently to receive full benefit: No      Stage of change: Preparation       Healthy Coping   Current state  Diabetes Skills, Confidence and Preparedness Index: Total score: 5  Skills: 4.9  Confidence: 4.0  Preparedness: 6.0       Would benefit from DSMES related to Healthy Coping: Yes      Identifies specific people, organizations,etc, that actively support their diabetes self-care efforts: No      Stage of change: Preparation     Reducing Risks  Current state  Vaccines:  Influenza:   Immunization History   Administered Date(s) Administered    Influenza, Quadrivalent, Adjuvanted (>65 Yrs FLUAD QUAD 49120) 01/26/2022       Pneumococcal: There is no immunization history for the selected administration types on file for this patient. Hepatitis: There is no immunization history for the selected administration types on file for this patient. Examinations:  Diabetic Foot and Eye Exam HM Status   Topic Date Due    Diabetic Foot Care  Never done    Eye Exam  Never done        Dental exam: due    Foot exam: due    Heart Protection:  BP Readings from Last 2 Encounters:   04/20/22 122/76   01/26/22 128/72        No results found for: LDL, LDLC, DLDLP     Kidney Protection:  No results found for: MCACR, MCA1, MCA2, MCA3, MCAU, MCAU2, MCALPOCT     Would benefit from [facility] related to Reducing Risks:  Yes      Actively participates in decision-making with provider regarding secondary prevention:  No      Stage of change: Preparation   Problem Solving  Current state  Hypoglycemia Management:  What are signs and symptoms of hypoglycemia that you experience: Trouble concentrating    How do you prevent hypoglycemia: take medications as instructed    How do you treat hypoglycemia: Patient is unaware of how to treat hypoglycemia    Hyperglycemia Management:  What are signs and symptoms of hyperglycemia that you experience: Fatigue    How can you prevent hyperglycemia: take medications as instructed    Sick Day Management:  What do you do differently on sick days:  Pt reported being unaware of self-management on sick days    Pattern Management:  Do you notice blood glucose patterns when you look at the readings in your meter or logbook? No    How do you use the blood glucose readings from your meter or logbook? Does not monitor     Would benefit from [facility] related to Problem Solving: Yes      Articulates appropriate strategies to address hypoglycemia, hyperglycemia, sick day care and BG pattern: No      Stage of change: Preparation       Note: Content derived from the American Association of Diabetes Educators' Diabetes Education Curriculum: A Guide to Successful Self-Management (3rd edition)      Priyanka Lin RN on 5/20/2022 at 9:20 AM    I have personally reviewed the health record, including provider notes, laboratory data and current medications before making these care and education recommendations. The time spent in this effort is included in the total time.   Total minutes: 30

## 2022-06-01 ENCOUNTER — CLINICAL SUPPORT (OUTPATIENT)
Dept: DIABETES SERVICES | Age: 72
End: 2022-06-01
Payer: MEDICARE

## 2022-06-01 DIAGNOSIS — E11.59 TYPE 2 DIABETES MELLITUS WITH OTHER CIRCULATORY COMPLICATION, WITH LONG-TERM CURRENT USE OF INSULIN (HCC): Primary | ICD-10-CM

## 2022-06-01 DIAGNOSIS — Z79.4 TYPE 2 DIABETES MELLITUS WITH OTHER CIRCULATORY COMPLICATION, WITH LONG-TERM CURRENT USE OF INSULIN (HCC): Primary | ICD-10-CM

## 2022-06-01 PROCEDURE — G0109 DIAB MANAGE TRN IND/GROUP: HCPCS | Performed by: DIETITIAN, REGISTERED

## 2022-06-02 NOTE — PROGRESS NOTES
[employer] [facility] [employer] [facility]  Diabetes Self-Management Education & Support Program  Encounter Note    SUMMARY  Diabetes self-care management training was completed related to reducing risks. he participant will return on June 8 to continue DSMES related to healthy eating and monitoring. The participant did identify SMART Goal(s) and will practice knowledge and skills related to reducing risks to improve diabetes self-management. EVALUATION:  During class discussion on how food become energy for the body, Mr Alexandra Roy shared that he only eats one meal per day. He asked, \"Is that why I get tired? \" He also shared that he has not had a diabetic foot or eye exam.    RECOMMENDATIONS:  1. Eat three meals per day  2. Schedule a diabetic foot, eye and dental exam  3. Complete Diabetes personal care record     TOPICS DISCUSSED TODAY:  WHAT IS DIABETES? Minutes: John Cabrales 58? 61      Next provider visit is scheduled for 7/20/2022 c Dr. Tamra Yeboah       SMART GOAL(S)  1.  Eat breakfast ate least 3 times over the next seven days. ACHIEVEMENT OF GOAL(S) : 0-24%         DATE DSMES TOPIC EVALUATION     6/2/2022 WHAT IS DIABETES?   a. Role of the normal pancreas in energy balance and blood glucose control   b. The defect seen in diabetes   c. Signs & symptoms of diabetes   d. Diagnosis of diabetes   e. Types of diabetes   f. Blood glucose targets in non-pregnant & non-pregnant adults       The participant knows   Their type of diabetes: Yes   The basic physiologic defect: Yes   Blood glucose targets: Yes     DATE DSMES TOPIC EVALUATION     6/2/2022 HOW DO I STAY HEALTHY?   a. Prevention    Vaccinations    Preconception care (if applicable)  b.  Examinations    Eye     Foot   c. Diabetic complications' prevention   [address]      The participant has a personal diabetes care record to keep abreast of diabetes health Yes The participant needs to address scheduling preventative eye, foot and dental exams; complete Diabetes personal care record. Joseline Caeg RD on 6/2/2022 at 7:51 PM    I have personally reviewed the health record, including provider notes, laboratory data and current medications before making these care and education recommendations. The time spent in this effort is included in the total time.   Total minutes: 120

## 2022-07-20 ENCOUNTER — OFFICE VISIT (OUTPATIENT)
Dept: FAMILY MEDICINE CLINIC | Age: 72
End: 2022-07-20
Payer: MEDICARE

## 2022-07-20 VITALS
BODY MASS INDEX: 27.31 KG/M2 | HEART RATE: 70 BPM | HEIGHT: 67 IN | DIASTOLIC BLOOD PRESSURE: 72 MMHG | TEMPERATURE: 98.7 F | RESPIRATION RATE: 18 BRPM | OXYGEN SATURATION: 97 % | SYSTOLIC BLOOD PRESSURE: 124 MMHG | WEIGHT: 174 LBS

## 2022-07-20 DIAGNOSIS — E10.65 HYPERGLYCEMIA DUE TO TYPE 1 DIABETES MELLITUS (HCC): Primary | ICD-10-CM

## 2022-07-20 DIAGNOSIS — Z91.14 NONCOMPLIANCE WITH MEDICATION REGIMEN: ICD-10-CM

## 2022-07-20 DIAGNOSIS — L84 CORN OF FOOT: ICD-10-CM

## 2022-07-20 DIAGNOSIS — E11.65 POORLY CONTROLLED DIABETES MELLITUS (HCC): ICD-10-CM

## 2022-07-20 DIAGNOSIS — I11.9 MALIGNANT HYPERTENSIVE HEART DISEASE WITHOUT HEART FAILURE: ICD-10-CM

## 2022-07-20 DIAGNOSIS — Z91.199 NONCOMPLIANCE WITH DIABETES TREATMENT: ICD-10-CM

## 2022-07-20 DIAGNOSIS — F17.200 TOBACCO DEPENDENCE: ICD-10-CM

## 2022-07-20 DIAGNOSIS — Z71.6 TOBACCO ABUSE COUNSELING: ICD-10-CM

## 2022-07-20 DIAGNOSIS — I25.2 HX OF MYOCARDIAL INFARCTION, GREATER THAN 8 WEEKS: ICD-10-CM

## 2022-07-20 LAB
ALBUMIN SERPL-MCNC: 4.4 G/DL (ref 3.7–4.7)
ALBUMIN/GLOB SERPL: 1.8 {RATIO} (ref 1.2–2.2)
ALP SERPL-CCNC: 88 IU/L (ref 44–121)
ALT SERPL-CCNC: 18 IU/L (ref 0–44)
AST SERPL-CCNC: 25 IU/L (ref 0–40)
BILIRUB SERPL-MCNC: 0.5 MG/DL (ref 0–1.2)
BUN SERPL-MCNC: 20 MG/DL (ref 8–27)
BUN/CREAT SERPL: 18 (ref 10–24)
CALCIUM SERPL-MCNC: 9.5 MG/DL (ref 8.6–10.2)
CHLORIDE SERPL-SCNC: 101 MMOL/L (ref 96–106)
CHOLEST SERPL-MCNC: 101 MG/DL (ref 100–199)
CO2 SERPL-SCNC: 24 MMOL/L (ref 20–29)
CREAT SERPL-MCNC: 1.1 MG/DL (ref 0.76–1.27)
EGFR: 71 ML/MIN/1.73
EST. AVERAGE GLUCOSE BLD GHB EST-MCNC: 289 MG/DL
GLOBULIN SER CALC-MCNC: 2.4 G/DL (ref 1.5–4.5)
GLUCOSE SERPL-MCNC: 257 MG/DL (ref 65–99)
HBA1C MFR BLD: 11.7 % (ref 4.8–5.6)
HDLC SERPL-MCNC: 35 MG/DL
LDLC SERPL CALC-MCNC: 50 MG/DL (ref 0–99)
POTASSIUM SERPL-SCNC: 4.8 MMOL/L (ref 3.5–5.2)
PROT SERPL-MCNC: 6.8 G/DL (ref 6–8.5)
SODIUM SERPL-SCNC: 140 MMOL/L (ref 134–144)
TRIGL SERPL-MCNC: 80 MG/DL (ref 0–149)
VLDLC SERPL CALC-MCNC: 16 MG/DL (ref 5–40)

## 2022-07-20 PROCEDURE — 99214 OFFICE O/P EST MOD 30 MIN: CPT | Performed by: FAMILY MEDICINE

## 2022-07-20 PROCEDURE — 3046F HEMOGLOBIN A1C LEVEL >9.0%: CPT | Performed by: FAMILY MEDICINE

## 2022-07-20 PROCEDURE — 2022F DILAT RTA XM EVC RTNOPTHY: CPT | Performed by: FAMILY MEDICINE

## 2022-07-20 PROCEDURE — G8510 SCR DEP NEG, NO PLAN REQD: HCPCS | Performed by: FAMILY MEDICINE

## 2022-07-20 PROCEDURE — G8536 NO DOC ELDER MAL SCRN: HCPCS | Performed by: FAMILY MEDICINE

## 2022-07-20 PROCEDURE — 1101F PT FALLS ASSESS-DOCD LE1/YR: CPT | Performed by: FAMILY MEDICINE

## 2022-07-20 PROCEDURE — G8754 DIAS BP LESS 90: HCPCS | Performed by: FAMILY MEDICINE

## 2022-07-20 PROCEDURE — 1123F ACP DISCUSS/DSCN MKR DOCD: CPT | Performed by: FAMILY MEDICINE

## 2022-07-20 PROCEDURE — 3017F COLORECTAL CA SCREEN DOC REV: CPT | Performed by: FAMILY MEDICINE

## 2022-07-20 PROCEDURE — G8417 CALC BMI ABV UP PARAM F/U: HCPCS | Performed by: FAMILY MEDICINE

## 2022-07-20 PROCEDURE — G8752 SYS BP LESS 140: HCPCS | Performed by: FAMILY MEDICINE

## 2022-07-20 PROCEDURE — G8427 DOCREV CUR MEDS BY ELIG CLIN: HCPCS | Performed by: FAMILY MEDICINE

## 2022-07-20 RX ORDER — METFORMIN HYDROCHLORIDE 1000 MG/1
1000 TABLET ORAL 2 TIMES DAILY WITH MEALS
COMMUNITY

## 2022-07-20 RX ORDER — INSULIN ASPART 100 [IU]/ML
75 INJECTION, SUSPENSION SUBCUTANEOUS 2 TIMES DAILY
Qty: 45 ML | Refills: 2 | Status: SHIPPED | OUTPATIENT
Start: 2022-07-20 | End: 2022-08-19

## 2022-07-20 RX ORDER — LINAGLIPTIN 5 MG/1
5 TABLET, FILM COATED ORAL DAILY
Qty: 90 TABLET | Refills: 0 | Status: SHIPPED | OUTPATIENT
Start: 2022-07-20

## 2022-07-20 NOTE — PROGRESS NOTES
Cassi Parry is a 67 y.o. male and presents with Follow-up, Diabetes, Hypertension, and Labs (Patient had labwork done yesterday July 19, 2022)  . HPI     Subjective:  Cardiovascular Review:  The patient has hypertension   Diet and Lifestyle: generally follows a low fat low cholesterol diet, generally follows a low sodium diet, exercises sporadically  Home BP Monitoring: is not measured at home. Pertinent ROS: taking medications as instructed, no medication side effects noted, no TIA's, no chest pain on exertion, no dyspnea on exertion, no swelling of ankles. Review of Systems  Review of Systems   Constitutional: Negative. Negative for chills and fever. HENT: Negative. Negative for congestion, ear discharge, hearing loss, nosebleeds and tinnitus. Eyes: Negative. Negative for blurred vision, double vision, photophobia and pain. Respiratory: Negative. Negative for cough, hemoptysis and sputum production. Cardiovascular: Negative. Negative for chest pain and palpitations. Gastrointestinal: Negative. Negative for heartburn, nausea and vomiting. Genitourinary: Negative. Negative for dysuria, frequency and urgency. Musculoskeletal: Negative. Negative for back pain and myalgias. Skin: Negative. Neurological: Negative. Negative for dizziness, tingling, weakness and headaches. Endo/Heme/Allergies: Negative. Psychiatric/Behavioral: Negative. Negative for depression and suicidal ideas. The patient does not have insomnia. All other systems reviewed and are negative.       Past Medical History:   Diagnosis Date    Abdominal pain 1/11/2021    Abscess of head, except face 1/11/2021    Automatic implantable cardiac defibrillator in situ 1/11/2021    Cholelithiasis 1/11/2021    Elevated blood pressure reading 1/11/2021    Epigastric pain 1/11/2021    Essential hypertension 1/11/2021    Gastroesophageal reflux disease 1/11/2021    History of adenomatous polyp of colon 1/11/2021 Hyperglycemia 1/11/2021    Hyperlipidemia 1/11/2021    Lesion of skin of scalp 1/11/2021    Myocardial infarction ([facility] 1/79/9163    Systolic murmur 9/95/9922    Tobacco dependence 1/11/2021    Type II diabetes mellitus ([facility] 1/11/2021     Past Surgical History:   Procedure Laterality Date    COLONOSCOPY N/A 6/1/2021    COLONOSCOPY performed by Brigid Cox MD at [facility] ENDOSCOPY    HX COLONOSCOPY      HX IMPLANTABLE CARDIOVERTER DEFIBRILLATOR      HX PROSTATE SURGERY       Social History     Socioeconomic History    Marital status:    Tobacco Use    Smoking status: Every Day     Packs/day: 0.25     Years: 20.00     Pack years: 5.00     Types: Cigarettes    Smokeless tobacco: Never   Vaping Use    Vaping Use: Never used   Substance and Sexual Activity    Alcohol use: Not Currently     Comment: occasionally    Drug use: Never     Social Determinants of Health     Financial Resource Strain: Low Risk     Difficulty of Paying Living Expenses: Not hard at all   Food Insecurity: No Food Insecurity    Worried About Running Out of Food in the Last Year: Never true    Ran Out of Food in the Last Year: Never true   Transportation Needs: No Transportation Needs    Lack of Transportation (Medical): No    Lack of Transportation (Non-Medical): No   Physical Activity: Insufficiently Active    Days of Exercise per Week: 3 days    Minutes of Exercise per Session: 20 min   Stress: No Stress Concern Present    Feeling of Stress : Not at all   Social Connections: Unknown    Frequency of Communication with Friends and Family: Three times a week    Frequency of Social Gatherings with Friends and Family:  Three times a week    Attends [employer] Services: 1 to 4 times per year    Active Member of Clubs or Organizations: No    Attends Club or Organization Meetings: Never   Housing Stability: Low Risk     Unable to Pay for Housing in the Last Year: No    Number of [place] in the Last Year: 1    Unstable Housing in the Last Year: No     Family History   Problem Relation Age of Onset    No Known Problems Mother     No Known Problems Father      Current Outpatient Medications   Medication Sig Dispense Refill    metFORMIN (GLUCOPHAGE) 1,000 mg tablet Take 1,000 mg by mouth two (2) times daily (with meals). NovoLOG Mix 70-30FlexPen U-100 100 unit/mL (70-30) inpn 75 Units by SubCUTAneous route two (2) times a day for 30 days. Indications: type 1 diabetes mellitus, type 2 diabetes mellitus 45 mL 2    linaGLIPtin (Tradjenta) 5 mg tablet Take 1 Tablet by mouth in the morning. 90 Tablet 0    famotidine (PEPCID) 40 mg tablet take 1 tablet by mouth once daily 30 Tablet 2    dilTIAZem IR (CARDIZEM) 120 mg tablet take 1 tablet by mouth once daily 90 Tablet 0    metoprolol succinate (TOPROL-XL) 50 mg XL tablet take 1 tablet by mouth once daily 90 Tablet 0    atorvastatin (LIPITOR) 40 mg tablet Take 1 Tablet by mouth daily. 90 Tablet 1    aspirin delayed-release 81 mg tablet Take 81 mg by mouth daily. Blood-Glucose Meter (Accu-Chek Lyudmila Plus Meter) misc 1 Device by Does Not Apply route two (2) times a day. 1 Each 0    glucose blood VI test strips (Accu-Chek Lyudmila Plus test strp) strip Test blood sugar twice a day 100 Strip 1    Lancing Device with Lancets (Accu-Chek Soft Dev Lancets) kit 1 Device by Does Not Apply route two (2) times a day. 1 Kit 1    Blood Glucose Control High&Low (Accu-Chek Lyudmila Control Soln) soln 3 mL by Does Not Apply route as needed (as needed for controls). 1 Bottle 1    alcohol swabs (Alcohol Pads) padm 1 Pad by Apply Externally route two (2) times a day. 100 Pad 1    pioglitazone (ACTOS) 45 mg tablet Take 45 mg by mouth daily. SITagliptin (JANUVIA) 100 mg tablet Take 100 mg by mouth daily.  (Patient not taking: Reported on 7/20/2022)      PEG 3350-Electrolytes (Golytely) 236-22.74-6.74 -5.86 gram suspension Used as directed (Patient not taking: No sig reported) 1 Bottle 0    empagliflozin (JARDIANCE) 10 mg tablet Take  by mouth daily. (Patient not taking: No sig reported)      lisinopriL (PRINIVIL, ZESTRIL) 10 mg tablet Take  by mouth daily. (Patient not taking: Reported on 7/20/2022)       No Known Allergies    Objective:  Visit Vitals  /72 (BP 1 Location: Right arm, BP Patient Position: Sitting, BP Cuff Size: Adult)   Pulse 70   Temp 98.7 °F (37.1 °C) (Oral)   Resp 18   Ht 5' 7\" (1.702 m)   Wt 174 lb (78.9 kg)   SpO2 97%   BMI 27.25 kg/m²       Physical Exam:   Physical Exam  Vitals and nursing note reviewed. Constitutional:       General: He is not in acute distress. Appearance: Normal appearance. He is obese. He is not ill-appearing, toxic-appearing or diaphoretic. HENT:      Head: Normocephalic and atraumatic. Right Ear: Tympanic membrane, ear canal and external ear normal. There is no impacted cerumen. Left Ear: Tympanic membrane, ear canal and external ear normal. There is no impacted cerumen. Nose: Nose normal. No congestion or rhinorrhea. Mouth/Throat:      Mouth: Mucous membranes are moist.      Pharynx: Oropharynx is clear. No oropharyngeal exudate or posterior oropharyngeal erythema. Eyes:      General: No scleral icterus. Right eye: No discharge. Left eye: No discharge. Extraocular Movements: Extraocular movements intact. Conjunctiva/sclera: Conjunctivae normal.      Pupils: Pupils are equal, round, and reactive to light. Neck:      Vascular: No carotid bruit. Cardiovascular:      Rate and Rhythm: Normal rate and regular rhythm. Pulses: Normal pulses. Heart sounds: Normal heart sounds. No murmur heard. No friction rub. No gallop. Pulmonary:      Effort: Pulmonary effort is normal. No respiratory distress. Breath sounds: Normal breath sounds. No stridor. No wheezing, rhonchi or rales. Chest:      Chest wall: No tenderness. Abdominal:      General: Abdomen is flat. Bowel sounds are normal. There is no distension. Palpations: Abdomen is soft. There is no mass. Tenderness: There is no abdominal tenderness. There is no right CVA tenderness, left CVA tenderness, guarding or rebound. Hernia: No hernia is present. Musculoskeletal:         General: No swelling, tenderness, deformity or signs of injury. Normal range of motion. Cervical back: Normal range of motion and neck supple. No rigidity. No muscular tenderness. Right lower leg: No edema. Left lower leg: No edema. Lymphadenopathy:      Cervical: No cervical adenopathy. Skin:     General: Skin is warm. Capillary Refill: Capillary refill takes 2 to 3 seconds. Coloration: Skin is not jaundiced or pale. Findings: No bruising, erythema, lesion or rash. Comments: Right plantar corns   Neurological:      General: No focal deficit present. Mental Status: He is alert and oriented to person, place, and time. Mental status is at baseline. Cranial Nerves: No cranial nerve deficit. Sensory: No sensory deficit. Motor: No weakness. Coordination: Coordination normal.      Gait: Gait normal.      Deep Tendon Reflexes: Reflexes normal.   Psychiatric:         Mood and Affect: Mood normal.         Behavior: Behavior normal.         Thought Content: Thought content normal.         Judgment: Judgment normal.           Results for orders placed or performed in visit on 04/20/22   AMB POC HEMOGLOBIN A1C   Result Value Ref Range    Hemoglobin A1c (POC) 9.7 %       Assessment/Plan:    ICD-10-CM ICD-9-CM    1. Hyperglycemia due to type 1 diabetes mellitus (HCC)  E10.65 250.01     increase Novolog 70/30 from 50 units bid to 75 units bid      2. Poorly controlled diabetes mellitus (HCC)  E11.65 250.00 REFERRAL TO PODIATRY      3. Malignant hypertensive heart disease without heart failure  I11.9 402.00       4. Hx of myocardial infarction, greater than 8 weeks  I25.2 412       5.  Noncompliance with diabetes treatment  Z91.19 V15.81 6. Noncompliance with medication regimen  Z91.14 V15.81       7. Tobacco abuse counseling  Z71.6 V65.42      305.1       8. Tobacco dependence  F17.200 305.1       9. [place] of foot  L84 [id number] REFERRAL TO PODIATRY    podiatrist referral        Orders Placed This Encounter    metFORMIN (GLUCOPHAGE) 1,000 mg tablet     Sig: Take 1,000 mg by mouth two (2) times daily (with meals). NovoLOG Mix 70-30FlexPen U-100 100 unit/mL (70-30) inpn     Si Units by SubCUTAneous route two (2) times a day for 30 days. Indications: type 1 diabetes mellitus, type 2 diabetes mellitus     Dispense:  45 mL     Refill:  2    linaGLIPtin (Tradjenta) 5 mg tablet     Sig: Take 1 Tablet by mouth in the morning. Dispense:  90 Tablet     Refill:  0     Cannot display discharge medications since this is not an admission.

## 2022-07-20 NOTE — PROGRESS NOTES
1. \"Have you been to the ER, urgent care clinic since your last visit? Hospitalized since your last visit? \" No    2. \"Have you seen or consulted any other health care providers outside of the [address] [place] [address] since your last visit? \" No     3. For patients aged 39-70: Has the patient had a colonoscopy / FIT/ Cologuard? Yes - Care Gap present. Most recent result on file      If the patient is female:    4. For patients aged 41-77: Has the patient had a mammogram within the past 2 years? NA - based on age or sex      11. For patients aged 21-65: Has the patient had a pap smear? NA - based on age or sex    Chief Complaint   Patient presents with    Follow-up    Diabetes    Hypertension    Labs     Patient had labwork done yesterday July 19, 2022        Visit Vitals  /72 (BP 1 Location: Right arm, BP Patient Position: Sitting, BP Cuff Size: Adult)   Pulse 70   Temp 98.7 °F (37.1 °C) (Oral)   Resp 18   Ht 5' 7\" (1.702 m)   Wt 174 lb (78.9 kg)   SpO2 97%   BMI 27.25 kg/m²        Patient is here for a 3 month follow up.  Labwork done this week, Hypertension, Diabetes

## 2022-07-20 NOTE — PATIENT INSTRUCTIONS
5 to 10 minutes spent discussing the benefits of quitting smoking and counseling on the ills and drawbacks of continuing smoking
No

## 2022-08-06 RX ORDER — METOPROLOL SUCCINATE 50 MG/1
TABLET, EXTENDED RELEASE ORAL
Qty: 90 TABLET | Refills: 0 | Status: SHIPPED | OUTPATIENT
Start: 2022-08-06

## 2022-08-06 RX ORDER — ATORVASTATIN CALCIUM 40 MG/1
TABLET, FILM COATED ORAL
Qty: 90 TABLET | Refills: 1 | Status: SHIPPED | OUTPATIENT
Start: 2022-08-06

## 2022-08-06 RX ORDER — DILTIAZEM HYDROCHLORIDE 120 MG/1
TABLET, FILM COATED ORAL
Qty: 90 TABLET | Refills: 0 | Status: SHIPPED | OUTPATIENT
Start: 2022-08-06

## 2022-08-24 ENCOUNTER — OFFICE VISIT (OUTPATIENT)
Dept: PODIATRY | Age: 72
End: 2022-08-24
Payer: MEDICARE

## 2022-08-24 VITALS
HEIGHT: 67 IN | TEMPERATURE: 97.1 F | WEIGHT: 174 LBS | DIASTOLIC BLOOD PRESSURE: 77 MMHG | BODY MASS INDEX: 27.31 KG/M2 | HEART RATE: 60 BPM | SYSTOLIC BLOOD PRESSURE: 145 MMHG

## 2022-08-24 DIAGNOSIS — D23.72 ECCRINE POROMA OF LEFT FOOT: ICD-10-CM

## 2022-08-24 DIAGNOSIS — M79.672 LEFT FOOT PAIN: ICD-10-CM

## 2022-08-24 DIAGNOSIS — E11.65 POORLY CONTROLLED DIABETES MELLITUS (HCC): Primary | ICD-10-CM

## 2022-08-24 PROCEDURE — 11306 SHAVE SKIN LESION 0.6-1.0 CM: CPT | Performed by: PODIATRIST

## 2022-08-24 PROCEDURE — 1123F ACP DISCUSS/DSCN MKR DOCD: CPT | Performed by: PODIATRIST

## 2022-08-24 PROCEDURE — 3046F HEMOGLOBIN A1C LEVEL >9.0%: CPT | Performed by: PODIATRIST

## 2022-08-24 PROCEDURE — 1101F PT FALLS ASSESS-DOCD LE1/YR: CPT | Performed by: PODIATRIST

## 2022-08-24 PROCEDURE — G8753 SYS BP > OR = 140: HCPCS | Performed by: PODIATRIST

## 2022-08-24 PROCEDURE — 3017F COLORECTAL CA SCREEN DOC REV: CPT | Performed by: PODIATRIST

## 2022-08-24 PROCEDURE — G8432 DEP SCR NOT DOC, RNG: HCPCS | Performed by: PODIATRIST

## 2022-08-24 PROCEDURE — G8536 NO DOC ELDER MAL SCRN: HCPCS | Performed by: PODIATRIST

## 2022-08-24 PROCEDURE — 2022F DILAT RTA XM EVC RTNOPTHY: CPT | Performed by: PODIATRIST

## 2022-08-24 PROCEDURE — G8754 DIAS BP LESS 90: HCPCS | Performed by: PODIATRIST

## 2022-08-24 PROCEDURE — 99203 OFFICE O/P NEW LOW 30 MIN: CPT | Performed by: PODIATRIST

## 2022-08-24 PROCEDURE — G8427 DOCREV CUR MEDS BY ELIG CLIN: HCPCS | Performed by: PODIATRIST

## 2022-08-24 PROCEDURE — G8417 CALC BMI ABV UP PARAM F/U: HCPCS | Performed by: PODIATRIST

## 2022-08-24 NOTE — PROGRESS NOTES
[facility]    Subjective:         Patient is a 67 y.o. male who is being seen as a new pt for a diabetic exam.  Patient states he has close follow-up with his primary care (Dr. Author Mccrary). Patient states his last visit with his primary was 07/20/2022. He describes his diabetes as being out of control, noting his last hemoglobin A1c was 11.7%. He admits to mild pedal pain, in the left foot, rising to the level of 2 out of 10. He states weightbearing exacerbates pain. He denies any overt trauma. He denies any breaks in skin or local/systemic signs of infection. He denies any recent diagnostic testing to confirm a diagnosis. He denies any recent changes in his shoe gear or activity level.   He denies any other lower extremity complaints      Past Medical History:   Diagnosis Date    Abdominal pain 1/11/2021    Abscess of head, except face 1/11/2021    Automatic implantable cardiac defibrillator in situ 1/11/2021    Cholelithiasis 1/11/2021    Elevated blood pressure reading 1/11/2021    Epigastric pain 1/11/2021    Essential hypertension 1/11/2021    Gastroesophageal reflux disease 1/11/2021    History of adenomatous polyp of colon 1/11/2021    Hyperglycemia 1/11/2021    Hyperlipidemia 1/11/2021    Lesion of skin of scalp 1/11/2021    Myocardial infarction ([facility] 2/72/8610    Systolic murmur 1/09/6350    Tobacco dependence 1/11/2021    Type II diabetes mellitus ([facility] 1/11/2021     Past Surgical History:   Procedure Laterality Date    COLONOSCOPY N/A 6/1/2021    COLONOSCOPY performed by Mary Wilson MD at [facility] ENDOSCOPY    HX COLONOSCOPY      HX IMPLANTABLE CARDIOVERTER DEFIBRILLATOR      HX PROSTATE SURGERY         Family History   Problem Relation Age of Onset    No Known Problems Mother     No Known Problems Father       Social History     Tobacco Use    Smoking status: Every Day     Packs/day: 0.25     Years: 20.00     Pack years: 5.00     Types: Cigarettes    Smokeless tobacco: Never   Substance Use Topics    Alcohol use: Not Currently     Comment: occasionally     No Known Allergies  Prior to Admission medications    Medication Sig Start Date End Date Taking? Authorizing Provider   atorvastatin (LIPITOR) 40 mg tablet take 1 tablet by mouth once daily 8/6/22   Shweta Celestin MD   metoprolol succinate (TOPROL-XL) 50 mg XL tablet take 1 tablet by mouth once daily 8/6/22   Shweta Celestin MD   dilTIAZem IR (CARDIZEM) 120 mg tablet take 1 tablet by mouth once daily 8/6/22   Shweta Celestin MD   metFORMIN (GLUCOPHAGE) 1,000 mg tablet Take 1,000 mg by mouth two (2) times daily (with meals). Provider, Historical   linaGLIPtin (Tradjenta) 5 mg tablet Take 1 Tablet by mouth in the morning. 7/20/22   Shweta Celestin MD   famotidine (PEPCID) 40 mg tablet take 1 tablet by mouth once daily 5/15/22   Shweta Celestin MD   pioglitazone (ACTOS) 45 mg tablet Take 45 mg by mouth daily. Provider, Historical   SITagliptin (JANUVIA) 100 mg tablet Take 100 mg by mouth daily. Patient not taking: Reported on 7/20/2022    Provider, Historical   aspirin delayed-release 81 mg tablet Take 81 mg by mouth daily. Provider, Historical   PEG 3350-Electrolytes (Golytely) 236-22.74-6.74 -5.86 gram suspension Used as directed  Patient not taking: No sig reported 5/21/21   Abhishek Boyd MD   Blood-Glucose Meter (Accu-Chek Lyudmila Plus Meter) misc 1 Device by Does Not Apply route two (2) times a day. 4/30/21   Shweta Celestin MD   glucose blood VI test strips (Accu-Chek Lyudmila Plus test strp) strip Test blood sugar twice a day 4/30/21   Shweta Celestin MD   Lancing Device with Lancets (Accu-Chek Soft Dev Lancets) kit 1 Device by Does Not Apply route two (2) times a day. 4/30/21   Shweta Celestin MD   Blood Glucose Control High&Low (Accu-Chek Lyudmila Control Soln) soln 3 mL by Does Not Apply route as needed (as needed for controls).  4/30/21   Shweta Celestin MD   alcohol swabs (Alcohol Pads) padm 1 Pad by Apply Externally route two (2) times a day. 4/30/21   Martha Mora MD   empagliflozin (JARDIANCE) 10 mg tablet Take  by mouth daily. Patient not taking: No sig reported    Provider, Historical   lisinopriL (PRINIVIL, ZESTRIL) 10 mg tablet Take  by mouth daily. Patient not taking: Reported on 7/20/2022    Provider, Historical       Review of Systems   Constitutional: Negative. HENT: Negative. Eyes: Negative. Respiratory: Negative. Cardiovascular: Negative. Gastrointestinal: Negative. Endocrine: Negative. Genitourinary: Negative. Musculoskeletal: Negative. Skin: Negative. Allergic/Immunologic: Positive for immunocompromised state. Neurological: Negative. Hematological: Negative. Psychiatric/Behavioral: Negative. All other systems reviewed and are negative. Objective:     Visit Vitals  BP (!) 145/77 (BP 1 Location: Right upper arm, BP Patient Position: Sitting, BP Cuff Size: Large adult)   Pulse 60   Temp 97.1 °F (36.2 °C) (Temporal)   Ht 5' 7\" (1.702 m)   Wt 174 lb (78.9 kg)   BMI 27.25 kg/m²       Physical Exam  Vitals reviewed. Constitutional:       Appearance: Normal appearance. Cardiovascular:      Pulses:           Dorsalis pedis pulses are 2+ on the right side and 2+ on the left side. Posterior tibial pulses are 2+ on the right side and 2+ on the left side. Pulmonary:      Effort: Pulmonary effort is normal.   Musculoskeletal:      Right lower leg: No edema. Left lower leg: No edema. Right foot: Normal range of motion. No deformity or bunion. Left foot: Normal range of motion. No deformity or bunion. Feet:      Right foot:      Protective Sensation: 10 sites tested. 10 sites sensed. Skin integrity: Skin integrity normal.      Toenail Condition: Right toenails are normal.      Left foot:      Protective Sensation: 10 sites tested. 10 sites sensed. Skin integrity: Callus present.       Toenail Condition: Left toenails are normal.   Lymphadenopathy:      Lower Body: No right inguinal adenopathy. No left inguinal adenopathy. Skin:     General: Skin is warm. Capillary Refill: Capillary refill takes 2 to 3 seconds. Neurological:      Mental Status: He is alert and oriented to person, place, and time. Psychiatric:         Mood and Affect: Mood and affect normal.         Behavior: Behavior is cooperative. Data Review: No results found for this or any previous visit (from the past 24 hour(s)). Impression:       ICD-10-CM ICD-9-CM    1. Poorly controlled diabetes mellitus (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 Left foot pain  M79.672 729.5           Recommendation:     Patient seen and evaluated in the office  Discussed and educated patient regarding his/her current medical condition as it pertains to his/her diabetes and his/her overall pedal health. Instructed patient to monitor his/her feet daily for possible wound formation, be compliant with all medications and wear supportive shoe gear for wound prevention. Reviewed and discussed most recent lab work, specifically as it pertains to his/her diabetes. Pt verbalized understanding of all discussed and reviewed  A painful raised hyper pigmented benign epidermal neoplasm was noted to the plantar aspect of the left. The lesion underwent a shave excision with a #15 blade removing the entire lesion with the greatest diameter of the lesion measuring 6 millimeters. Hemostasis and anesthesia as needed. An appropriate dressing was applied. Raheem Rodriguez, [address] [facility]  [address] [place] [address]  O: [phone]  F: [phone]  C: [phone]

## 2022-08-24 NOTE — PROGRESS NOTES
Chief Complaint   Patient presents with    Callouses     1. \"Have you been to the ER, urgent care clinic since your last visit? Hospitalized since your last visit? \" No    2. \"Have you seen or consulted any other health care providers outside of the [address] [place] [address] since your last visit? \" No     3. For patients aged 39-70: Has the patient had a colonoscopy / FIT/ Cologuard? YES      If the patient is female:    4. For patients aged 41-77: Has the patient had a mammogram within the past 2 years? NA - based on age or sex      11. For patients aged 21-65: Has the patient had a pap smear?  NA - based on age or sex  PCP-Dr Melvin Higginbotham

## 2022-10-18 ENCOUNTER — PATIENT OUTREACH (OUTPATIENT)
Dept: CASE MANAGEMENT | Age: 72
End: 2022-10-18

## 2022-10-18 NOTE — PROGRESS NOTES
10/18/2022  2:24 PM    Patient outreach attempt by this ACM today to perform CCM assessment. Attempts to reach patient were unsuccessful. Left voicemail requesting call back and ACM contact information.      Tevin Blanc RN, BSN - Ambulatory Care Manager  [phone]

## 2022-10-18 NOTE — PROGRESS NOTES
Ambulatory Care Management Note    Date/Time:  10/18/2022 2:34 PM    This patient was received as a referral from [employer]. Ambulatory Care Manager outreached to patient today to offer care management services. Introduction to self and role of care manager provided. Patient accepted care management services at this time. Follow up call scheduled at this time. Patient has Ambulatory Care Manager's contact number for any questions or concerns. PMH below, spoke with pt about diabetes management. Pt reports does not check his BG on a regular basis, also does not take his medications consistently due to him forgetting to take them. Denies issues affording medications. Most recent A1C was 11.7 in 7/2022. Has follow up appt with PCP tomorrow. Past Medical History:   Diagnosis Date    Abdominal pain 1/11/2021    Abscess of head, except face 1/11/2021    Automatic implantable cardiac defibrillator in situ 1/11/2021    Cholelithiasis 1/11/2021    Elevated blood pressure reading 1/11/2021    Epigastric pain 1/11/2021    Essential hypertension 1/11/2021    Gastroesophageal reflux disease 1/11/2021    History of adenomatous polyp of colon 1/11/2021    Hyperglycemia 1/11/2021    Hyperlipidemia 1/11/2021    Lesion of skin of scalp 1/11/2021    Myocardial infarction ([address] 5/62/2905    Systolic murmur 6/64/2421    Tobacco dependence 1/11/2021    Type II diabetes mellitus ([address] 1/11/2021        ACM will follow up in 7-10 days.    Yanelis Mendoza, RN, BSN - Ambulatory Care Manager  [phone]

## 2022-12-02 ENCOUNTER — PATIENT OUTREACH (OUTPATIENT)
Dept: CASE MANAGEMENT | Age: 72
End: 2022-12-02

## 2022-12-02 DIAGNOSIS — E11.65 POORLY CONTROLLED DIABETES MELLITUS (HCC): Primary | ICD-10-CM

## 2022-12-02 NOTE — PROGRESS NOTES
Ambulatory Care Management Note    Date/Time:  12/2/2022 2:40 PM     This patient was received as a referral from [address] [place] [address] Report    This [facility] reviewed and updated the following screenings during this call; general assessment, disease specific assessment , self management assessment, SDOH assessments, ACP assessment and note, and medication reconciliation     Patient's challenges to self-management identified:   lack of knowledge about disease, level of motivation, medical condition, medication management, and polypharmacy    Medication Management:  poor adherence and poor understanding    Advance Care Planning:   Does patient have an Advance Directive:  currently not on file; patient declined education    [employer], Referrals, and Durable Medical Equipment: Pt expressed interest in CGM. ACM will reach out to provider to facilitate. Health Maintenance Due   Topic Date Due    Hepatitis C Screening  Never done    Pneumococcal 65+ years (1 - PCV) Never done    Foot Exam Q1  Never done    MICROALBUMIN Q1  Never done    Eye Exam Retinal or Dilated  Never done    DTaP/Tdap/Td series (1 - Tdap) Never done    Shingrix Vaccine Age 50> (1 of 2) Never done    AAA Screening 73-67 YO Male Smoking Patients  Never done    Medicare Yearly Exam  Never done    COVID-19 Vaccine (3 - Booster for Moderna series) 04/21/2021    Flu Vaccine (1) 08/01/2022    A1C test (Diabetic or Prediabetic)  10/19/2022     Health Maintenance Reviewed: Not reviewed during this call. Top Challenges reviewed with the provider   -Pt only takes DM meds when he feels like his BG might be high. Not currently checking BG. Expressed interest in CGM, and hoping this will help him see his high BG numbers and encourage him to take his meds more regularly. Ambulatory  contacted patient for discussion and case management of diabetes.    Summary of patients top problems:   Diabetes - Hx of DM, managed by PCP. Pt does not  monitor blood sugar at home. Pt is not compliant with diabetic diet, is not compliant with DM medications. Most recent A1C was 11.7 on 7/22/22. Pt has poor understanding of disease trajectory and complications. PCP/Specialist follow up: No future appointments. Goals        Patient verbalizes understanding of self -management goals of living with Diabetes. 12/2/2022  Pt to: Understand BG goals and how to reduce risk of diabetes complications. Understand effects of food on blood sugar. Demonstrate knowledge of medication actions, side effects, and missed doses. Understand red flag s/s, how to manage hyper/hypoglycemia, and when to seek medical care. Educated patient regarding diabetes management and importance of blood sugar control. Reviewed risks of uncontrolled blood sugars including risk of heart disease, renal failure, blindness, neuropathy, stroke, and vascular disease. Using teach back, pt to verbalize understanding of BG goal of  fasting (ideally <100),  non-fasting (ideally <130), and A1C <7 (ideally closer to 6.5). Pt reports only takes meds when he feels bad, does not check BG because its \"a pain\" and he often forgets. Discussed CGM, pt expressed interest in one. ACM will contact PCP to see if he will order CGM for pt. ACM will follow up in 2 weeks. CD               Patient verbalized understanding of all information discussed. Patient has this Ambulatory Care Manager's contact information for any further questions, concerns, or needs.       Jared Bush RN, BSN - Ambulatory Care Manager  [phone]

## 2023-01-25 ENCOUNTER — PATIENT OUTREACH (OUTPATIENT)
Dept: CASE MANAGEMENT | Age: 73
End: 2023-01-25

## 2023-01-25 NOTE — PROGRESS NOTES
1/25/2023  11:48 AM    ACM Follow Up Call:     Goals Addressed                   This Visit's Progress     Patient verbalizes understanding of self -management goals of living with Diabetes. Not on track     12/2/2022  Pt to: Understand BG goals and how to reduce risk of diabetes complications. Understand effects of food on blood sugar. Demonstrate knowledge of medication actions, side effects, and missed doses. Understand red flag s/s, how to manage hyper/hypoglycemia, and when to seek medical care. Educated patient regarding diabetes management and importance of blood sugar control. Reviewed risks of uncontrolled blood sugars including risk of heart disease, renal failure, blindness, neuropathy, stroke, and vascular disease. Using teach back, pt to verbalize understanding of BG goal of  fasting (ideally <100),  non-fasting (ideally <130), and A1C <7 (ideally closer to 6.5). Pt reports only takes meds when he feels bad, does not check BG because its \"a pain\" and he often forgets. Discussed CGM, pt expressed interest in one. AC will contact PCP to see if he will order CGM for pt. ACM will follow up in 2 weeks. CD    1/25/2023  Pt admits he is still not taking his DM meds every day or checking his BG. Discussed risks of uncontrolled DM with patient using car-fuel analogy. Educated on limiting sugar and carb intake, avoiding sodas and sugary drinks, and increasing health food options like fruits and vegetables. Pt verbalized understanding using teach back. ACM previously reached out to pt's PCP requesting order for CGM. Spoke with pt today and he states he has not received a CGM yet, also has not had anyone from PCP's office reach out to him about it. AC will reach back out to PCP's office for assistance and follow up with pt in 2 weeks/when I hear back from PCP.  CD                Mook Mireles RN, BSN - Ambulatory Care Manager  [phone]

## 2023-02-20 ENCOUNTER — PATIENT OUTREACH (OUTPATIENT)
Dept: CASE MANAGEMENT | Age: 73
End: 2023-02-20

## 2023-02-20 NOTE — PROGRESS NOTES
Ambulatory Care Management Note     Patient has graduated from the Complex Case Management program on 2/20/2023. Patient Current Location: [place]    Patient/family has the ability to self-manage at this time. Care management goals have been completed. No further Ambulatory Care Manager follow up scheduled. Goals Addressed                   This Visit's Progress     COMPLETED: Patient verbalizes understanding of self -management goals of living with Diabetes. Not on track     12/2/2022  Pt to: Understand BG goals and how to reduce risk of diabetes complications. Understand effects of food on blood sugar. Demonstrate knowledge of medication actions, side effects, and missed doses. Understand red flag s/s, how to manage hyper/hypoglycemia, and when to seek medical care. Educated patient regarding diabetes management and importance of blood sugar control. Reviewed risks of uncontrolled blood sugars including risk of heart disease, renal failure, blindness, neuropathy, stroke, and vascular disease. Using teach back, pt to verbalize understanding of BG goal of  fasting (ideally <100),  non-fasting (ideally <130), and A1C <7 (ideally closer to 6.5). Pt reports only takes meds when he feels bad, does not check BG because its \"a pain\" and he often forgets. Discussed CGM, pt expressed interest in one. ACM will contact PCP to see if he will order CGM for pt. ACM will follow up in 2 weeks. CD    1/25/2023  Pt admits he is still not taking his DM meds every day or checking his BG. Discussed risks of uncontrolled DM with patient using car-fuel analogy. Educated on limiting sugar and carb intake, avoiding sodas and sugary drinks, and increasing health food options like fruits and vegetables. Pt verbalized understanding using teach back. ACM previously reached out to pt's PCP requesting order for CGM.  Spoke with pt today and he states he has not received a CGM yet, also has not had anyone from PCP's office reach out to him about it. ACM will reach back out to PCP's office for assistance and follow up with pt in 2 weeks/when I hear back from PCP. CD    2/20/2023  ACM previously sent [employer] message to PCP to inquire about CGM being ordered, but never got response. Spoke with pt today and reports he still has not received his CGM or heard anything about it from PCP's office. Offered to reach out to PCP again, but pt no longer interested in CGM or DM management at this time. Inability to progress towards goals at this time. Resources and education previously provided. Pt has ACM contact information for future needs. Goals completed and episode resolved at this time. CD               Patients upcoming visits:  No future appointments. Patient has Ambulatory Care Manager's contact information for any further questions, concerns, or needs.         Terrance Saldana RN, BSN - Ambulatory Care Manager  [phone]

## 2023-03-15 ENCOUNTER — OFFICE VISIT (OUTPATIENT)
Dept: FAMILY MEDICINE CLINIC | Age: 73
End: 2023-03-15
Payer: MEDICARE

## 2023-03-15 VITALS
SYSTOLIC BLOOD PRESSURE: 156 MMHG | WEIGHT: 178.2 LBS | DIASTOLIC BLOOD PRESSURE: 75 MMHG | HEIGHT: 67 IN | BODY MASS INDEX: 27.97 KG/M2 | HEART RATE: 60 BPM | OXYGEN SATURATION: 96 % | RESPIRATION RATE: 16 BRPM | TEMPERATURE: 99.1 F

## 2023-03-15 DIAGNOSIS — Z91.14 NONCOMPLIANCE WITH MEDICATION REGIMEN: ICD-10-CM

## 2023-03-15 DIAGNOSIS — Z91.199 NONCOMPLIANCE WITH DIABETES TREATMENT: ICD-10-CM

## 2023-03-15 DIAGNOSIS — Z86.010 HX OF COLONIC POLYP: ICD-10-CM

## 2023-03-15 DIAGNOSIS — F17.200 TOBACCO DEPENDENCE: ICD-10-CM

## 2023-03-15 DIAGNOSIS — E11.65 POORLY CONTROLLED DIABETES MELLITUS (HCC): ICD-10-CM

## 2023-03-15 DIAGNOSIS — I11.9 MALIGNANT HYPERTENSIVE HEART DISEASE WITHOUT HEART FAILURE: ICD-10-CM

## 2023-03-15 DIAGNOSIS — Z71.89 ACP (ADVANCE CARE PLANNING): ICD-10-CM

## 2023-03-15 DIAGNOSIS — K21.00 GASTROESOPHAGEAL REFLUX DISEASE WITH ESOPHAGITIS WITHOUT HEMORRHAGE: ICD-10-CM

## 2023-03-15 DIAGNOSIS — Z71.6 TOBACCO ABUSE COUNSELING: ICD-10-CM

## 2023-03-15 DIAGNOSIS — Z00.00 MEDICARE ANNUAL WELLNESS VISIT, SUBSEQUENT: Primary | ICD-10-CM

## 2023-03-15 PROCEDURE — G8417 CALC BMI ABV UP PARAM F/U: HCPCS | Performed by: FAMILY MEDICINE

## 2023-03-15 PROCEDURE — 2022F DILAT RTA XM EVC RTNOPTHY: CPT | Performed by: FAMILY MEDICINE

## 2023-03-15 PROCEDURE — 3078F DIAST BP <80 MM HG: CPT | Performed by: FAMILY MEDICINE

## 2023-03-15 PROCEDURE — 3017F COLORECTAL CA SCREEN DOC REV: CPT | Performed by: FAMILY MEDICINE

## 2023-03-15 PROCEDURE — 1123F ACP DISCUSS/DSCN MKR DOCD: CPT | Performed by: FAMILY MEDICINE

## 2023-03-15 PROCEDURE — G8510 SCR DEP NEG, NO PLAN REQD: HCPCS | Performed by: FAMILY MEDICINE

## 2023-03-15 PROCEDURE — 3046F HEMOGLOBIN A1C LEVEL >9.0%: CPT | Performed by: FAMILY MEDICINE

## 2023-03-15 PROCEDURE — G8536 NO DOC ELDER MAL SCRN: HCPCS | Performed by: FAMILY MEDICINE

## 2023-03-15 PROCEDURE — 3077F SYST BP >= 140 MM HG: CPT | Performed by: FAMILY MEDICINE

## 2023-03-15 PROCEDURE — 99214 OFFICE O/P EST MOD 30 MIN: CPT | Performed by: FAMILY MEDICINE

## 2023-03-15 PROCEDURE — 99406 BEHAV CHNG SMOKING 3-10 MIN: CPT | Performed by: FAMILY MEDICINE

## 2023-03-15 PROCEDURE — 1101F PT FALLS ASSESS-DOCD LE1/YR: CPT | Performed by: FAMILY MEDICINE

## 2023-03-15 PROCEDURE — G0439 PPPS, SUBSEQ VISIT: HCPCS | Performed by: FAMILY MEDICINE

## 2023-03-15 PROCEDURE — G8427 DOCREV CUR MEDS BY ELIG CLIN: HCPCS | Performed by: FAMILY MEDICINE

## 2023-03-15 RX ORDER — ATORVASTATIN CALCIUM 40 MG/1
40 TABLET, FILM COATED ORAL DAILY
Qty: 90 TABLET | Refills: 0 | Status: SHIPPED | OUTPATIENT
Start: 2023-03-15

## 2023-03-15 RX ORDER — PIOGLITAZONEHYDROCHLORIDE 45 MG/1
45 TABLET ORAL DAILY
Qty: 90 TABLET | Refills: 0 | Status: SHIPPED | OUTPATIENT
Start: 2023-03-15 | End: 2023-06-13

## 2023-03-15 RX ORDER — METOPROLOL SUCCINATE 50 MG/1
50 TABLET, EXTENDED RELEASE ORAL DAILY
Qty: 90 TABLET | Refills: 0 | Status: SHIPPED | OUTPATIENT
Start: 2023-03-15

## 2023-03-15 RX ORDER — LINAGLIPTIN 5 MG/1
5 TABLET, FILM COATED ORAL DAILY
Qty: 90 TABLET | Refills: 0 | Status: SHIPPED | OUTPATIENT
Start: 2023-03-15

## 2023-03-15 RX ORDER — FAMOTIDINE 40 MG/1
40 TABLET, FILM COATED ORAL DAILY
Qty: 30 TABLET | Refills: 2 | Status: SHIPPED | OUTPATIENT
Start: 2023-03-15

## 2023-03-15 RX ORDER — METFORMIN HYDROCHLORIDE 1000 MG/1
1000 TABLET ORAL 2 TIMES DAILY WITH MEALS
Qty: 180 TABLET | Refills: 0 | Status: SHIPPED | OUTPATIENT
Start: 2023-03-15 | End: 2023-06-13

## 2023-03-15 RX ORDER — ASPIRIN 81 MG/1
81 TABLET ORAL DAILY
Qty: 90 TABLET | Refills: 0 | Status: SHIPPED | OUTPATIENT
Start: 2023-03-15 | End: 2023-06-13

## 2023-03-15 NOTE — PROGRESS NOTES
This is the Subsequent Medicare Annual Wellness Exam, performed 12 months or more after the Initial AWV or the last Subsequent AWV    I have reviewed the patient's medical history in detail and updated the computerized patient record. Assessment/Plan   Education and counseling provided:  Are appropriate based on today's review and evaluation    1. Medicare annual wellness visit, subsequent  -     REFERRAL TO [facility] CLINICAL SPECIALIST  2. Tobacco dependence  3. Tobacco abuse counseling  4. Malignant hypertensive heart disease without heart failure  -     METABOLIC PANEL, BASIC  5. Noncompliance with diabetes treatment  6. Noncompliance with medication regimen  7. Hx of colonic polyp  8. Poorly controlled diabetes mellitus ([facility] [address]  Comments:  improved since last visit  Orders:  -     METABOLIC PANEL, BASIC  -     HEMOGLOBIN A1C WITH EAG  9. Gastroesophageal reflux disease with esophagitis without hemorrhage  10.  [facility] (advance care planning)  -     REFERRAL TO [facility] SPECIAL[facility]       Depression Risk Factor Screening     3 most recent PHQ Screens 3/15/2023   Little interest or pleasure in doing things Not at all   Feeling down, depressed, irritable, or hopeless Not at all   Total Score PHQ 2 0   Trouble falling or staying asleep, or sleeping too much Not at all   Feeling tired or having little energy Not at all   Poor appetite, weight loss, or overeating Not at all   Feeling bad about yourself - or that you are a failure or have let yourself or your family down Not at all   Trouble concentrating on things such as school, work, reading, or watching TV Not at all   Moving or speaking so slowly that other people could have noticed; or the opposite being so fidgety that others notice Not at all   Thoughts of being better off dead, or hurting yourself in some way Not at all   PHQ 9 Score 0   How difficult have these problems made it for you to do your work, take care of your home and get along with others Not difficult at all       Alcohol & Drug Abuse Risk Screen    Do you average more than 1 drink per night or more than 7 drinks a week: No    In the past three months have you have had more than 4 drinks containing alcohol on one occasion: No          Functional Ability and Level of Safety    Hearing: Hearing is good. Activities of Daily Living: The home contains: no safety equipment. Patient does total self care      Ambulation: with no difficulty     Fall Risk:  Fall Risk Assessment, last 12 mths 3/15/2023   Able to walk? Yes   Fall in past 12 months? 0   Do you feel unsteady?  0   Are you worried about falling 0      Abuse Screen:  Patient is not abused       Cognitive Screening    Has your family/caregiver stated any concerns about your memory: no     Cognitive Screening: Normal - Clock Drawing Test    Health Maintenance Due     Health Maintenance Due   Topic Date Due    Hepatitis C Screening  Never done    Pneumococcal 65+ years (1 - PCV) Never done    Foot Exam Q1  Never done    Eye Exam Retinal or Dilated  Never done    Diabetic Alb to Cr ratio (uACR) test  Never done    DTaP/Tdap/Td series (1 - Tdap) Never done    Shingles Vaccine (1 of 2) Never done    AAA Screening 73-69 YO Male Smoking Patients  Never done    COVID-19 Vaccine (3 - Booster for Moderna series) 04/21/2021    Flu Vaccine (1) 08/01/2022    A1C test (Diabetic or Prediabetic)  10/19/2022       Patient Care Team   Patient Care Team:  Inder Orozco MD as PCP - General (Family Medicine)  Inder Orozco MD as PCP - [facility] Lindsay Cueto Provider    History     Patient Active Problem List   Diagnosis Code    Abdominal pain R10.9    Abscess of head, except face L02.811    Automatic implantable cardiac defibrillator in situ Z95.810    Cholelithiasis K80.20    Type II diabetes mellitus ([facility] [address] E11.9    Elevated blood pressure reading R03.0    Essential hypertension I10    Epigastric pain R10.13    Gastroesophageal reflux disease K21.9    History of adenomatous polyp of colon Z86.010    Hyperglycemia R73.9    Hyperlipidemia E78.5    Lesion of skin of scalp L98.9    Myocardial infarction (HCC) G27.2    Systolic murmur I26.6    Tobacco dependence F17.200    Hx of myocardial infarction, greater than 8 weeks I25.2    Tobacco abuse counseling Z71.6    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    Poorly controlled diabetes mellitus (HCC) E11.65    Hyperglycemia due to type 1 diabetes mellitus ([address] E10.65    Noncompliance with medication regimen Z91.14    Hx of colonic polyp Z86.010    Noncompliance with diabetes treatment Z91.199    Leon of foot L84     Past Medical History:   Diagnosis Date    Abdominal pain 1/11/2021    Abscess of head, except face 1/11/2021    Automatic implantable cardiac defibrillator in situ 1/11/2021    Cholelithiasis 1/11/2021    Elevated blood pressure reading 1/11/2021    Epigastric pain 1/11/2021    Essential hypertension 1/11/2021    Gastroesophageal reflux disease 1/11/2021    History of adenomatous polyp of colon 1/11/2021    Hyperglycemia 1/11/2021    Hyperlipidemia 1/11/2021    Lesion of skin of scalp 1/11/2021    Myocardial infarction ([address] 3/42/8208    Systolic murmur 0/85/3698    Tobacco dependence 1/11/2021    Type II diabetes mellitus ([address] 1/11/2021      Past Surgical History:   Procedure Laterality Date    COLONOSCOPY N/A 6/1/2021    COLONOSCOPY performed by Abilio Yan MD at [facility] ENDOSCOPY    HX COLONOSCOPY      HX IMPLANTABLE CARDIOVERTER DEFIBRILLATOR      HX PROSTATE SURGERY       Current Outpatient Medications   Medication Sig Dispense Refill    atorvastatin (LIPITOR) 40 mg tablet Take 1 Tablet by mouth daily. 90 Tablet 0    aspirin delayed-release 81 mg tablet Take 1 Tablet by mouth daily for 90 days. 90 Tablet 0    famotidine (PEPCID) 40 mg tablet Take 1 Tablet by mouth daily. 30 Tablet 2    linaGLIPtin (Tradjenta) 5 mg tablet Take 1 Tablet by mouth daily.  90 Tablet 0    metoprolol succinate (TOPROL-XL) 50 mg XL tablet Take 1 Tablet by mouth daily. 90 Tablet 0    pioglitazone (ACTOS) 45 mg tablet Take 1 Tablet by mouth daily for 90 days. 90 Tablet 0    metFORMIN (GLUCOPHAGE) 1,000 mg tablet Take 1 Tablet by mouth two (2) times daily (with meals) for 90 days. 180 Tablet 0    dilTIAZem IR (CARDIZEM) 120 mg tablet take 1 tablet by mouth once daily 90 Tablet 0    SITagliptin (JANUVIA) 100 mg tablet Take 100 mg by mouth daily. (Patient not taking: No sig reported)      PEG 3350-Electrolytes (Golytely) 236-22.74-6.74 -5.86 gram suspension Used as directed (Patient not taking: No sig reported) 1 Bottle 0    Blood-Glucose Meter (Accu-Chek Lyudmila Plus Meter) misc 1 Device by Does Not Apply route two (2) times a day. 1 Each 0    glucose blood VI test strips (Accu-Chek Lyudmila Plus test strp) strip Test blood sugar twice a day 100 Strip 1    Lancing Device with Lancets (Accu-Chek Soft Dev Lancets) kit 1 Device by Does Not Apply route two (2) times a day. 1 Kit 1    Blood Glucose Control High&Low (Accu-Chek Lyudmila Control Soln) soln 3 mL by Does Not Apply route as needed (as needed for controls). 1 Bottle 1    alcohol swabs (Alcohol Pads) padm 1 Pad by Apply Externally route two (2) times a day. 100 Pad 1    empagliflozin (JARDIANCE) 10 mg tablet Take  by mouth daily. (Patient not taking: No sig reported)      lisinopriL (PRINIVIL, ZESTRIL) 10 mg tablet Take  by mouth daily. (Patient not taking: No sig reported)       No Known Allergies    Family History   Problem Relation Age of Onset    No Known Problems Mother     No Known Problems Father      Social History     Tobacco Use    Smoking status: Every Day     Packs/day: 0.25     Years: 20.00     Pack years: 5.00     Types: Cigarettes    Smokeless tobacco: Never   Substance Use Topics    Alcohol use: Not Currently     Comment: occasionally       Kerrie Barahona is a 68 y.o. male and presents with Annual Wellness Visit  .   HPI     Subjective:  Cardiovascular Review:  The patient has hypertension   Diet and Lifestyle: generally follows a low fat low cholesterol diet, generally follows a low sodium diet, exercises sporadically  Home BP Monitoring: is not measured at home. Pertinent ROS: taking medications as instructed, no medication side effects noted, no TIA's, no chest pain on exertion, no dyspnea on exertion, no swelling of ankles. Review of Systems  Review of Systems   Constitutional: Negative. Negative for chills and fever. HENT: Negative. Negative for congestion, ear discharge, hearing loss, nosebleeds and tinnitus. Eyes: Negative. Negative for blurred vision, double vision, photophobia and pain. Respiratory: Negative. Negative for cough, hemoptysis and sputum production. Cardiovascular: Negative. Negative for chest pain and palpitations. Gastrointestinal: Negative. Negative for heartburn, nausea and vomiting. Genitourinary: Negative. Negative for dysuria, frequency and urgency. Musculoskeletal: Negative. Negative for back pain and myalgias. Skin: Negative. Neurological: Negative. Negative for dizziness, tingling, weakness and headaches. Endo/Heme/Allergies: Negative. Psychiatric/Behavioral: Negative. Negative for depression and suicidal ideas. The patient does not have insomnia. All other systems reviewed and are negative.       Past Medical History:   Diagnosis Date    Abdominal pain 1/11/2021    Abscess of head, except face 1/11/2021    Automatic implantable cardiac defibrillator in situ 1/11/2021    Cholelithiasis 1/11/2021    Elevated blood pressure reading 1/11/2021    Epigastric pain 1/11/2021    Essential hypertension 1/11/2021    Gastroesophageal reflux disease 1/11/2021    History of adenomatous polyp of colon 1/11/2021    Hyperglycemia 1/11/2021    Hyperlipidemia 1/11/2021    Lesion of skin of scalp 1/11/2021    Myocardial infarction ([facility] [address] 8/14/6246    Systolic murmur 6/58/2559    Tobacco dependence 1/11/2021 Type II diabetes mellitus ([facility] [address] 1/11/2021     Past Surgical History:   Procedure Laterality Date    COLONOSCOPY N/A 6/1/2021    COLONOSCOPY performed by Moises Gupta MD at [address] [place] [address] ENDOSCOPY    HX COLONOSCOPY      HX IMPLANTABLE CARDIOVERTER DEFIBRILLATOR      HX PROSTATE SURGERY       Social History     Socioeconomic History    Marital status:    Tobacco Use    Smoking status: Every Day     Packs/day: 0.25     Years: 20.00     Pack years: 5.00     Types: Cigarettes    Smokeless tobacco: Never   Vaping Use    Vaping Use: Never used   Substance and Sexual Activity    Alcohol use: Not Currently     Comment: occasionally    Drug use: Never     Social Determinants of Health     Financial Resource Strain: Low Risk     Difficulty of Paying Living Expenses: Not hard at all   Food Insecurity: No Food Insecurity    Worried About Running Out of Food in the Last Year: Never true    Ran Out of Food in the Last Year: Never true   Transportation Needs: No Transportation Needs    Lack of Transportation (Medical): No    Lack of Transportation (Non-Medical): No   Physical Activity: Insufficiently Active    Days of Exercise per Week: 3 days    Minutes of Exercise per Session: 10 min   Stress: No Stress Concern Present    Feeling of Stress : Not at all   Social Connections: Moderately Integrated    Frequency of Communication with Friends and Family: More than three times a week    Frequency of Social Gatherings with Friends and Family:  Three times a week    Attends Judaism Services: 1 to 4 times per year    Active Member of Clubs or Organizations: No    Attends Club or Organization Meetings: Never    Marital Status:    Housing Stability: Low Risk     Unable to Pay for Housing in the Last Year: No    Number of Places Lived in the Last Year: 1    Unstable Housing in the Last Year: No     Family History   Problem Relation Age of Onset    No Known Problems Mother     No Known Problems Father      Current Outpatient Medications   Medication Sig Dispense Refill    atorvastatin (LIPITOR) 40 mg tablet Take 1 Tablet by mouth daily. 90 Tablet 0    aspirin delayed-release 81 mg tablet Take 1 Tablet by mouth daily for 90 days. 90 Tablet 0    famotidine (PEPCID) 40 mg tablet Take 1 Tablet by mouth daily. 30 Tablet 2    linaGLIPtin (Tradjenta) 5 mg tablet Take 1 Tablet by mouth daily. 90 Tablet 0    metoprolol succinate (TOPROL-XL) 50 mg XL tablet Take 1 Tablet by mouth daily. 90 Tablet 0    pioglitazone (ACTOS) 45 mg tablet Take 1 Tablet by mouth daily for 90 days. 90 Tablet 0    metFORMIN (GLUCOPHAGE) 1,000 mg tablet Take 1 Tablet by mouth two (2) times daily (with meals) for 90 days. 180 Tablet 0    dilTIAZem IR (CARDIZEM) 120 mg tablet take 1 tablet by mouth once daily 90 Tablet 0    SITagliptin (JANUVIA) 100 mg tablet Take 100 mg by mouth daily. (Patient not taking: No sig reported)      PEG 3350-Electrolytes (Golytely) 236-22.74-6.74 -5.86 gram suspension Used as directed (Patient not taking: No sig reported) 1 Bottle 0    Blood-Glucose Meter (Accu-Chek Lyudmila Plus Meter) misc 1 Device by Does Not Apply route two (2) times a day. 1 Each 0    glucose blood VI test strips (Accu-Chek Lyudmila Plus test strp) strip Test blood sugar twice a day 100 Strip 1    Lancing Device with Lancets (Accu-Chek Soft Dev Lancets) kit 1 Device by Does Not Apply route two (2) times a day. 1 Kit 1    Blood Glucose Control High&Low (Accu-Chek Lyudmila Control Soln) soln 3 mL by Does Not Apply route as needed (as needed for controls). 1 Bottle 1    alcohol swabs (Alcohol Pads) padm 1 Pad by Apply Externally route two (2) times a day. 100 Pad 1    empagliflozin (JARDIANCE) 10 mg tablet Take  by mouth daily. (Patient not taking: No sig reported)      lisinopriL (PRINIVIL, ZESTRIL) 10 mg tablet Take  by mouth daily.  (Patient not taking: No sig reported)       No Known Allergies    Objective:  Visit Vitals  BP (!) 156/75 (BP 1 Location: Left upper arm, BP Patient Position: Sitting, BP Cuff Size: Adult)   Pulse 60   Temp 99.1 °F (37.3 °C) (Oral)   Resp 16   Ht 5' 7\" (1.702 m)   Wt 178 lb 3.2 oz (80.8 kg)   SpO2 96%   BMI 27.91 kg/m²       Physical Exam:   Physical Exam  Vitals and nursing note reviewed. Constitutional:       General: He is not in acute distress. Appearance: Normal appearance. He is obese. He is not ill-appearing, toxic-appearing or diaphoretic. HENT:      Head: Normocephalic and atraumatic. Right Ear: Tympanic membrane, ear canal and external ear normal. There is no impacted cerumen. Left Ear: Tympanic membrane, ear canal and external ear normal. There is no impacted cerumen. Nose: Nose normal. No congestion or rhinorrhea. Mouth/Throat:      Mouth: Mucous membranes are moist.      Pharynx: Oropharynx is clear. No oropharyngeal exudate or posterior oropharyngeal erythema. Eyes:      General: No scleral icterus. Right eye: No discharge. Left eye: No discharge. Extraocular Movements: Extraocular movements intact. Conjunctiva/sclera: Conjunctivae normal.      Pupils: Pupils are equal, round, and reactive to light. Neck:      Vascular: No carotid bruit. Cardiovascular:      Rate and Rhythm: Normal rate and regular rhythm. Pulses: Normal pulses. Heart sounds: Normal heart sounds. No murmur heard. No friction rub. No gallop. Pulmonary:      Effort: Pulmonary effort is normal. No respiratory distress. Breath sounds: Normal breath sounds. No stridor. No wheezing, rhonchi or rales. Chest:      Chest wall: No tenderness. Abdominal:      General: Abdomen is flat. Bowel sounds are normal. There is no distension. Palpations: Abdomen is soft. There is no mass. Tenderness: There is no abdominal tenderness. There is no right CVA tenderness, left CVA tenderness, guarding or rebound. Hernia: No hernia is present.    Musculoskeletal:         General: No swelling, tenderness, deformity or signs of injury. Normal range of motion. Cervical back: Normal range of motion and neck supple. No rigidity. No muscular tenderness. Right lower leg: No edema. Left lower leg: No edema. Lymphadenopathy:      Cervical: No cervical adenopathy. Skin:     General: Skin is warm. Capillary Refill: Capillary refill takes 2 to 3 seconds. Coloration: Skin is not jaundiced or pale. Findings: No bruising, erythema, lesion or rash. Neurological:      General: No focal deficit present. Mental Status: He is alert and oriented to person, place, and time. Mental status is at baseline. Cranial Nerves: No cranial nerve deficit. Sensory: No sensory deficit. Motor: No weakness. Coordination: Coordination normal.      Gait: Gait normal.      Deep Tendon Reflexes: Reflexes normal.   Psychiatric:         Mood and Affect: Mood normal.         Behavior: Behavior normal.         Thought Content: Thought content normal.         Judgment: Judgment normal.           Results for orders placed or performed in visit on 04/20/22   AMB POC HEMOGLOBIN A1C   Result Value Ref Range    Hemoglobin A1c (POC) 9.7 %       Assessment/Plan:    ICD-10-CM ICD-9-CM    1. Medicare annual wellness visit, subsequent  Z00.00 V70.0 REFERRAL TO [facility] CLINICAL SPECIALIST      2. Tobacco dependence  F17.200 305.1       3. Tobacco abuse counseling  Z71.6 V65.42      305.1       4. Malignant hypertensive heart disease without heart failure  S72.2 088.14 METABOLIC PANEL, BASIC      5. Noncompliance with diabetes treatment  Z91.199 V15.81       6. Noncompliance with medication regimen  Z91.14 V15.81       7. Hx of colonic polyp  Z86.010 V12.72       8. Poorly controlled diabetes mellitus ([facility] [address]  K95.95 054.86 METABOLIC PANEL, BASIC      HEMOGLOBIN A1C WITH EAG    improved since last visit      9. Gastroesophageal reflux disease with esophagitis without hemorrhage  K21.00 530.81      530.10       10.  ACP (advance care planning)  Z71.89 V65.49 REFERRAL TO [facility] CLINICAL SPECIALIST        Orders Placed This Encounter    METABOLIC PANEL, BASIC    HEMOGLOBIN A1C WITH EAG    REFERRAL TO [facility] CLINICAL SPECIALIST     Referral Priority:   Routine     Referral Type: Other     Referral Reason:   Specialty Services Required     Number of Visits Requested:   1    atorvastatin (LIPITOR) 40 mg tablet     Sig: Take 1 Tablet by mouth daily. Dispense:  90 Tablet     Refill:  0    aspirin delayed-release 81 mg tablet     Sig: Take 1 Tablet by mouth daily for 90 days. Dispense:  90 Tablet     Refill:  0    famotidine (PEPCID) 40 mg tablet     Sig: Take 1 Tablet by mouth daily. Dispense:  30 Tablet     Refill:  2    linaGLIPtin (Tradjenta) 5 mg tablet     Sig: Take 1 Tablet by mouth daily. Dispense:  90 Tablet     Refill:  0    metoprolol succinate (TOPROL-XL) 50 mg XL tablet     Sig: Take 1 Tablet by mouth daily. Dispense:  90 Tablet     Refill:  0    pioglitazone (ACTOS) 45 mg tablet     Sig: Take 1 Tablet by mouth daily for 90 days. Dispense:  90 Tablet     Refill:  0    metFORMIN (GLUCOPHAGE) 1,000 mg tablet     Sig: Take 1 Tablet by mouth two (2) times daily (with meals) for 90 days. Dispense:  180 Tablet     Refill:  0     Cannot display discharge medications since this is not an admission. Follow-up and Dispositions    Return in about 3 months (around 6/15/2023).            Nancy Aguilar MD

## 2023-03-15 NOTE — PROGRESS NOTES
1. \"Have you been to the ER, urgent care clinic since your last visit? Hospitalized since your last visit? \" No    2. \"Have you seen or consulted any other health care providers outside of the [address] [place] [address] since your last visit? \" No     3. For patients aged 39-70: Has the patient had a colonoscopy / FIT/ Cologuard? Yes - Care Gap present. Most recent result on file      If the patient is female:    4. For patients aged 41-77: Has the patient had a mammogram within the past 2 years? NA - based on age or sex      11. For patients aged 21-65: Has the patient had a pap smear? NA - based on age or sex     Chief Complaint   Patient presents with    Annual Wellness Visit       Visit Vitals  BP (!) 156/75 (BP 1 Location: Left upper arm, BP Patient Position: Sitting, BP Cuff Size: Adult)   Pulse 60   Temp 99.1 °F (37.3 °C) (Oral)   Resp 16   Ht 5' 7\" (1.702 m)   Wt 178 lb 3.2 oz (80.8 kg)   SpO2 96%   BMI 27.91 kg/m²     Pt is here for a medicare wellness, bp was elevated both times i checked. This is the Subsequent Medicare Annual Wellness Exam, performed 12 months or more after the Initial AWV or the last Subsequent AWV    I have reviewed the patient's medical history in detail and updated the computerized patient record. Assessment/Plan   Education and counseling provided:  Are appropriate based on today's review and evaluation    1.  Medicare annual wellness visit, subsequent     Depression Risk Factor Screening     3 most recent PHQ Screens 3/15/2023   Little interest or pleasure in doing things Not at all   Feeling down, depressed, irritable, or hopeless Not at all   Total Score PHQ 2 0   Trouble falling or staying asleep, or sleeping too much Not at all   Feeling tired or having little energy Not at all   Poor appetite, weight loss, or overeating Not at all   Feeling bad about yourself - or that you are a failure or have let yourself or your family down Not at all   Trouble concentrating on things such as school, work, reading, or watching TV Not at all   Moving or speaking so slowly that other people could have noticed; or the opposite being so fidgety that others notice Not at all   Thoughts of being better off dead, or hurting yourself in some way Not at all   PHQ 9 Score 0   How difficult have these problems made it for you to do your work, take care of your home and get along with others Not difficult at all       Alcohol & Drug Abuse Risk Screen    Do you average more than 1 drink per night or more than 7 drinks a week: No    In the past three months have you have had more than 4 drinks containing alcohol on one occasion: Yes          Functional Ability and Level of Safety    Hearing: Hearing is good. Activities of Daily Living: The home contains: no safety equipment. Patient does total self care      Ambulation: with no difficulty     Fall Risk:  Fall Risk Assessment, last 12 mths 3/15/2023   Able to walk? Yes   Fall in past 12 months? 0   Do you feel unsteady?  0   Are you worried about falling 0      Abuse Screen:  Patient is not abused       Cognitive Screening    Has your family/caregiver stated any concerns about your memory: no     Cognitive Screening: Normal - Clock Drawing Test, Mini Cog Test    Health Maintenance Due     Health Maintenance Due   Topic Date Due    Hepatitis C Screening  Never done    Pneumococcal 65+ years (1 - PCV) Never done    Foot Exam Q1  Never done    Eye Exam Retinal or Dilated  Never done    Diabetic Alb to Cr ratio (uACR) test  Never done    DTaP/Tdap/Td series (1 - Tdap) Never done    Shingles Vaccine (1 of 2) Never done    AAA Screening 73-69 YO Male Smoking Patients  Never done    COVID-19 Vaccine (3 - Booster for Moderna series) 04/21/2021    Flu Vaccine (1) 08/01/2022    A1C test (Diabetic or Prediabetic)  10/19/2022       Patient Care Team   Patient Care Team:  Violeta Chu MD as PCP - General (Family Medicine)  Violeta Chu MD as PCP - [facility]    History     Patient Active Problem List   Diagnosis Code    Abdominal pain R10.9    Abscess of head, except face L02.811    Automatic implantable cardiac defibrillator in situ Z95.810    Cholelithiasis K80.20    Type II diabetes mellitus ([address] E11.9    Elevated blood pressure reading R03.0    Essential hypertension I10    Epigastric pain R10.13    Gastroesophageal reflux disease K21.9    History of adenomatous polyp of colon Z86.010    Hyperglycemia R73.9    Hyperlipidemia E78.5    Lesion of skin of scalp L98.9    Myocardial infarction (HCC) J78.9    Systolic murmur X08.8    Tobacco dependence F17.200    Hx of myocardial infarction, greater than 8 weeks I25.2    Tobacco abuse counseling Z71.6    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    Poorly controlled diabetes mellitus ([address] E11.65    Hyperglycemia due to type 1 diabetes mellitus ([address] E10.65    Noncompliance with medication regimen Z91.14    Hx of colonic polyp Z86.010    Noncompliance with diabetes treatment Z91.199    [place] of foot L84     Past Medical History:   Diagnosis Date    Abdominal pain 1/11/2021    Abscess of head, except face 1/11/2021    Automatic implantable cardiac defibrillator in situ 1/11/2021    Cholelithiasis 1/11/2021    Elevated blood pressure reading 1/11/2021    Epigastric pain 1/11/2021    Essential hypertension 1/11/2021    Gastroesophageal reflux disease 1/11/2021    History of adenomatous polyp of colon 1/11/2021    Hyperglycemia 1/11/2021    Hyperlipidemia 1/11/2021    Lesion of skin of scalp 1/11/2021    Myocardial infarction ([address] 9/89/2400    Systolic murmur 7/73/3979    Tobacco dependence 1/11/2021    Type II diabetes mellitus ([address] 1/11/2021      Past Surgical History:   Procedure Laterality Date    COLONOSCOPY N/A 6/1/2021    COLONOSCOPY performed by Grace Hopper MD at [address]      HX IMPLANTABLE CARDIOVERTER DEFIBRILLATOR      HX PROSTATE SURGERY Current Outpatient Medications   Medication Sig Dispense Refill    atorvastatin (LIPITOR) 40 mg tablet take 1 tablet by mouth once daily 90 Tablet 1    metoprolol succinate (TOPROL-XL) 50 mg XL tablet take 1 tablet by mouth once daily 90 Tablet 0    dilTIAZem IR (CARDIZEM) 120 mg tablet take 1 tablet by mouth once daily 90 Tablet 0    metFORMIN (GLUCOPHAGE) 1,000 mg tablet Take 1,000 mg by mouth two (2) times daily (with meals). linaGLIPtin (Tradjenta) 5 mg tablet Take 1 Tablet by mouth in the morning. 90 Tablet 0    famotidine (PEPCID) 40 mg tablet take 1 tablet by mouth once daily 30 Tablet 2    pioglitazone (ACTOS) 45 mg tablet Take 45 mg by mouth daily. aspirin delayed-release 81 mg tablet Take 81 mg by mouth daily. SITagliptin (JANUVIA) 100 mg tablet Take 100 mg by mouth daily. (Patient not taking: No sig reported)      PEG 3350-Electrolytes (Golytely) 236-22.74-6.74 -5.86 gram suspension Used as directed (Patient not taking: No sig reported) 1 Bottle 0    Blood-Glucose Meter (Accu-Chek Lyudmila Plus Meter) misc 1 Device by Does Not Apply route two (2) times a day. 1 Each 0    glucose blood VI test strips (Accu-Chek Lyudmila Plus test strp) strip Test blood sugar twice a day 100 Strip 1    Lancing Device with Lancets (Accu-Chek Soft Dev Lancets) kit 1 Device by Does Not Apply route two (2) times a day. 1 Kit 1    Blood Glucose Control High&Low (Accu-Chek Lyudmila Control Soln) soln 3 mL by Does Not Apply route as needed (as needed for controls). 1 Bottle 1    alcohol swabs (Alcohol Pads) padm 1 Pad by Apply Externally route two (2) times a day. 100 Pad 1    empagliflozin (JARDIANCE) 10 mg tablet Take  by mouth daily. (Patient not taking: No sig reported)      lisinopriL (PRINIVIL, ZESTRIL) 10 mg tablet Take  by mouth daily.  (Patient not taking: No sig reported)       No Known Allergies    Family History   Problem Relation Age of Onset    No Known Problems Mother     No Known Problems Father Social History     Tobacco Use    Smoking status: Every Day     Packs/day: 0.25     Years: 20.00     Pack years: 5.00     Types: Cigarettes    Smokeless tobacco: Never   Substance Use Topics    Alcohol use: Not Currently     Comment: occasionally         Marly Needle

## 2023-03-15 NOTE — PATIENT INSTRUCTIONS
8 minutes spent discussing the benefits of quitting smoking and counseling on the ills and drawbacks of continuing smoking    Medicare Wellness Visit, Male    The best way to live healthy is to have a lifestyle where you eat a well-balanced diet, exercise regularly, limit alcohol use, and quit all forms of tobacco/nicotine, if applicable. Regular preventive services are another way to keep healthy. Preventive services (vaccines, screening tests, monitoring & exams) can help personalize your care plan, which helps you manage your own care. Screening tests can find health problems at the earliest stages, when they are easiest to treat. [facility] follows the current, evidence-based guidelines published by the [facility] ([facility]) when recommending preventive services for our patients. Because we follow these guidelines, sometimes recommendations change over time as research supports it. (For example, a prostate screening blood test is no longer routinely recommended for men with no symptoms). Of course, you and your doctor may decide to screen more often for some diseases, based on your risk and co-morbidities (chronic disease you are already diagnosed with). Preventive services for you include:  - Medicare offers their members a free annual wellness visit, which is time for you and your primary care provider to discuss and plan for your preventive service needs.  Take advantage of this benefit every year!    -Over the age of 72 should receive the recommended pneumonia vaccines.   -All adults should have a flu vaccine yearly.  -All adults should receive a tetanus vaccine every 10 years.  -Over the age of 48 should receive the shingles vaccines.    -All adults should be screened once for Hepatitis C.  -All adults age 38-68 who are overweight should have a diabetes screening test once every three years.   -Other screening tests & preventive services for persons with diabetes include: an eye exam to screen for diabetic retinopathy, a kidney function test, a foot exam, and stricter control over your cholesterol.   -Cardiovascular screening for adults with routine risk involves an electrocardiogram (ECG) at intervals determined by the provider.     -Colorectal cancer screening should be done for adults age 43-69 with no increased risk factors for colorectal cancer. There are a number of acceptable methods of screening for this type of cancer. Each test has its own benefits and drawbacks. Discuss with your provider what is most appropriate for you during your annual wellness visit. The different tests include: colonoscopy (considered the best screening method), a fecal occult blood test, a fecal DNA test, and sigmoidoscopy.    -Lung cancer screening is recommended annually with a low dose CT scan for adults between age 54 and 68, who have smoked at least 30 pack years (equivalent of 1 pack per day for 30 days), and who is a current smoker or quit less than 15 years ago. -An Abdominal Aortic Aneurysm (AAA) Screening is recommended for men age 73-68 who has ever smoked in their lifetime.      Here is a list of your current Health Maintenance items (your personalized list of preventive services) with a due date:  Health Maintenance Due   Topic Date Due    Hepatitis C Test  Never done    Pneumococcal Vaccine (1 - PCV) Never done    Diabetic Foot Care  Never done    Eye Exam  Never done    URINE CHECK FOR KIDNEY PROBLEMS  Never done    DTaP/Tdap/Td  (1 - Tdap) Never done    Shingles Vaccine (1 of 2) Never done    AAA Screening  Never done    COVID-19 Vaccine (3 - Booster for Moderna series) 04/21/2021    Yearly Flu Vaccine (1) 08/01/2022    Hemoglobin A1C    10/19/2022

## 2023-03-16 ENCOUNTER — PATIENT OUTREACH (OUTPATIENT)
Dept: CASE MANAGEMENT | Age: 73
End: 2023-03-16

## 2023-03-16 NOTE — ACP (ADVANCE CARE PLANNING)
Advance Care Planning   Ambulatory ACP Specialist Patient Outreach    Date:  3/16/2023    ACP Specialist:  Sunita Han    Outreach call to patient in follow-up to ACP Specialist referral from:  Bridgette Rodriguez MD    [x] PCP  [] Provider   [] Ambulatory Care Management [] Other     For:                  [x] Advance Directive Assistance              [x] Complete Portable DNR order              [x] Complete POST/POLST/MOST              [x] Code Status Discussion             [] Discuss Goals of Care             [] Early ACP Decision-Making              [] Other (Specify)    Date Referral Received:  3/15/2023    Outreaches:       [x] 1st -  Date:  3/16/2023                              Intervention:  [] Spoke with Patient  By: [] Phone [] Left Voice mail [x] Email / Mail    [] MyChart  [] Other (Specify) : Outcomes: Attempted ACP outreach - no answer. Not able to leave VM as mailbox is full. Mailed  ACP outreach to patient's home address on file with ACP information sheets \"What is Advance Care Planning\" and \"How to Choose a Health Care Agent\" and a copy of [facility] AMD form (ACP Coordinator contact information included). Will attempt outreach again in one week if return call not received. [] 2nd -  Date:                                Intervention:  [] Spoke with Patient  By: [] Phone [] Left Voice mail [] Email / Mail    [] [facility]  [] Other [id number]) : Outcomes:                [] 3rd -  Date:                               Intervention:  [] Spoke with Patient  By: [] Phone [] Left Voice mail [] Email / Mail    [] MyChart  [] Other [id number]) : Outcomes:           []  Additional Outreach -  Date:     (Specify Dates & special circumstances):                     Outcomes:       Next Step:   [] ACP scheduled conversation  [x] Outreach again in one week               [] Email / Mail ACP Info Sheets  [] Email / Mail Advance Directive   [] Closing referral.  Routing closure to referring provider/staff and to ACP Specialist . [] Closure letter mailed to patient with invitation to contact ACP Specialist if / when ready. [] Other (Specify here):       Thank you for this referral.

## 2023-05-23 RX ORDER — DILTIAZEM HYDROCHLORIDE 120 MG/1
1 TABLET, FILM COATED ORAL DAILY
COMMUNITY
Start: 2022-08-06

## 2023-05-23 RX ORDER — METOPROLOL SUCCINATE 50 MG/1
50 TABLET, EXTENDED RELEASE ORAL DAILY
COMMUNITY
Start: 2023-03-15

## 2023-05-23 RX ORDER — ASPIRIN 81 MG/1
81 TABLET ORAL DAILY
Qty: 30 TABLET | Refills: 2 | COMMUNITY
Start: 2023-03-15 | End: 2023-06-13

## 2023-05-23 RX ORDER — FAMOTIDINE 40 MG/1
40 TABLET, FILM COATED ORAL DAILY
COMMUNITY
Start: 2023-03-15

## 2023-05-23 RX ORDER — ATORVASTATIN CALCIUM 40 MG/1
40 TABLET, FILM COATED ORAL DAILY
COMMUNITY
Start: 2023-03-15

## 2023-05-23 RX ORDER — PIOGLITAZONEHYDROCHLORIDE 45 MG/1
45 TABLET ORAL DAILY
Qty: 30 TABLET | Refills: 2 | COMMUNITY
Start: 2023-03-15 | End: 2023-06-13

## 2023-05-23 RX ORDER — LISINOPRIL 10 MG/1
TABLET ORAL DAILY
COMMUNITY

## 2023-07-06 ENCOUNTER — HOSPITAL ENCOUNTER (EMERGENCY)
Facility: HOSPITAL | Age: 73
Discharge: HOME | End: 2023-07-06
Attending: EMERGENCY MEDICINE
Payer: MEDICARE

## 2023-07-06 VITALS
HEART RATE: 62 BPM | BODY MASS INDEX: 29.03 KG/M2 | WEIGHT: 185 LBS | TEMPERATURE: 98 F | DIASTOLIC BLOOD PRESSURE: 97 MMHG | OXYGEN SATURATION: 100 % | SYSTOLIC BLOOD PRESSURE: 176 MMHG | RESPIRATION RATE: 16 BRPM | HEIGHT: 67 IN

## 2023-07-06 DIAGNOSIS — T15.92XA FOREIGN BODY OF LEFT EXTERNAL EYE, INITIAL ENCOUNTER: Primary | ICD-10-CM

## 2023-07-06 PROCEDURE — 99283 EMERGENCY DEPT VISIT LOW MDM: CPT

## 2023-07-06 PROCEDURE — 6370000000 HC RX 637 (ALT 250 FOR IP): Performed by: EMERGENCY MEDICINE

## 2023-07-06 RX ORDER — TETRACAINE HYDROCHLORIDE 5 MG/ML
1 SOLUTION OPHTHALMIC
Status: COMPLETED | OUTPATIENT
Start: 2023-07-06 | End: 2023-07-06

## 2023-07-06 RX ORDER — ERYTHROMYCIN 5 MG/G
OINTMENT OPHTHALMIC
Qty: 1 EACH | Refills: 0 | Status: SHIPPED | OUTPATIENT
Start: 2023-07-06 | End: 2023-07-16

## 2023-07-06 RX ADMIN — FLUORESCEIN SODIUM 1 MG: 1 STRIP OPHTHALMIC at 05:00

## 2023-07-06 RX ADMIN — TETRACAINE HYDROCHLORIDE 1 DROP: 5 SOLUTION OPHTHALMIC at 05:01

## 2023-07-06 ASSESSMENT — PAIN - FUNCTIONAL ASSESSMENT: PAIN_FUNCTIONAL_ASSESSMENT: 0-10

## 2023-07-06 ASSESSMENT — LIFESTYLE VARIABLES
HOW MANY STANDARD DRINKS CONTAINING ALCOHOL DO YOU HAVE ON A TYPICAL DAY: PATIENT DOES NOT DRINK
HOW OFTEN DO YOU HAVE A DRINK CONTAINING ALCOHOL: NEVER

## 2023-07-06 ASSESSMENT — PAIN DESCRIPTION - LOCATION: LOCATION: EYE

## 2023-07-06 ASSESSMENT — PAIN DESCRIPTION - ORIENTATION: ORIENTATION: LEFT

## 2023-07-06 ASSESSMENT — PAIN SCALES - GENERAL: PAINLEVEL_OUTOF10: 6

## 2023-07-06 NOTE — ED PROVIDER NOTES
of Transportation (Non-Medical): No   Physical Activity: Insufficiently Active    Days of Exercise per Week: 3 days    Minutes of Exercise per Session: 10 min   Stress: No Stress Concern Present    Feeling of Stress : Not at all   Social Connections: Moderately Integrated    Frequency of Communication with Friends and Family: More than three times a week    Frequency of Social Gatherings with Friends and Family: Three times a week    Attends [employer] Services: 1 to 4 times per year    Active Member of Clubs or Organizations: No    Attends Club or Organization Meetings: Never    Marital Status:    Intimate Partner Violence: Not At Risk    Fear of Current or Ex-Partner: No    Emotionally Abused: No    Physically Abused: No    Sexually Abused: No   Depression: Not at risk    PHQ-2 Score: 0   Housing Stability: Low Risk     Unable to Pay for Housing in the Last Year: No    Number of Places Lived in the Last Year: 1    Unstable Housing in the Last Year: No       PHYSICAL EXAM   Physical Exam  Vitals and nursing note reviewed. Constitutional:       Appearance: Normal appearance. He is normal weight. HENT:      Head: Normocephalic and atraumatic. Nose: Nose normal.      Mouth/Throat:      Mouth: Mucous membranes are moist.   Eyes:      General:         Right eye: No discharge. Left eye: No discharge. Extraocular Movements: Extraocular movements intact. Conjunctiva/sclera: Conjunctivae normal.      Pupils: Pupils are equal, round, and reactive to light. Comments: Tiny FB visualized at 11 o'clock in the L eye on the edge of the iris   Cardiovascular:      Rate and Rhythm: Normal rate. Pulses: Normal pulses. Pulmonary:      Effort: Pulmonary effort is normal. No respiratory distress. Musculoskeletal:         General: No swelling or deformity. Normal range of motion. Neurological:      General: No focal deficit present. Mental Status: He is alert.    Psychiatric:

## 2023-07-06 NOTE — ED NOTES
Verbal shift change report given to Ayesha Ha (oncoming nurse) by Purvi Morel (offgoing nurse). Report included the following information Nurse Handoff Report.         Karoline Conn RN  07/06/23 [id number]

## 2023-07-06 NOTE — ED NOTES
Provider at bedside for dispo and follow up. Discharge plan reviewed and paperwork signed, teaching completed including treatment received, medications and follow up plan of care, pain level within manageable comfortable limits,, ambulatory to exit, gait steady, safety maintained.        Ginny Carlson RN  07/06/23 1993

## 2023-07-06 NOTE — ED TRIAGE NOTES
Pt c/o eye pain and possible foreign body in L eye. Pt reports he was cutting down a tree limb 5-6 days ago when the pain started. Pt notes trying eye drops without any relief.

## 2023-08-10 ENCOUNTER — HOSPITAL ENCOUNTER (EMERGENCY)
Facility: HOSPITAL | Age: 73
Discharge: HOME | End: 2023-08-10
Attending: EMERGENCY MEDICINE
Payer: MEDICARE

## 2023-08-10 VITALS
TEMPERATURE: 98.3 F | WEIGHT: 185 LBS | RESPIRATION RATE: 16 BRPM | DIASTOLIC BLOOD PRESSURE: 85 MMHG | OXYGEN SATURATION: 99 % | HEIGHT: 67 IN | HEART RATE: 60 BPM | SYSTOLIC BLOOD PRESSURE: 171 MMHG | BODY MASS INDEX: 29.03 KG/M2

## 2023-08-10 DIAGNOSIS — N39.0 URINARY TRACT INFECTION WITH HEMATURIA, SITE UNSPECIFIED: Primary | ICD-10-CM

## 2023-08-10 DIAGNOSIS — R31.9 URINARY TRACT INFECTION WITH HEMATURIA, SITE UNSPECIFIED: Primary | ICD-10-CM

## 2023-08-10 LAB
APPEARANCE UR: ABNORMAL
BACTERIA URNS QL MICRO: ABNORMAL /HPF
BILIRUB UR QL CFM: POSITIVE
BILIRUB UR QL: ABNORMAL
COLOR UR: ABNORMAL
EPITH CASTS URNS QL MICRO: ABNORMAL /LPF
GLUCOSE UR STRIP.AUTO-MCNC: 50 MG/DL
HGB UR QL STRIP: ABNORMAL
KETONES UR QL STRIP.AUTO: 15 MG/DL
LEUKOCYTE ESTERASE UR QL STRIP.AUTO: ABNORMAL
NITRITE UR QL STRIP.AUTO: NEGATIVE
PH UR STRIP: 6 (ref 5–8)
PROT UR STRIP-MCNC: >300 MG/DL
RBC #/AREA URNS HPF: >100 /HPF (ref 0–5)
SP GR UR REFRACTOMETRY: 1.02 (ref 1–1.03)
UROBILINOGEN UR QL STRIP.AUTO: 4 EU/DL (ref 0.2–1)
WBC URNS QL MICRO: ABNORMAL /HPF (ref 0–4)

## 2023-08-10 PROCEDURE — 81001 URINALYSIS AUTO W/SCOPE: CPT

## 2023-08-10 PROCEDURE — 99283 EMERGENCY DEPT VISIT LOW MDM: CPT

## 2023-08-10 PROCEDURE — 6370000000 HC RX 637 (ALT 250 FOR IP): Performed by: EMERGENCY MEDICINE

## 2023-08-10 RX ORDER — CEPHALEXIN 500 MG/1
500 CAPSULE ORAL
Status: COMPLETED | OUTPATIENT
Start: 2023-08-10 | End: 2023-08-10

## 2023-08-10 RX ORDER — CEPHALEXIN 500 MG/1
500 CAPSULE ORAL 4 TIMES DAILY
Qty: 28 CAPSULE | Refills: 0 | Status: SHIPPED | OUTPATIENT
Start: 2023-08-10 | End: 2023-08-17

## 2023-08-10 RX ADMIN — CEPHALEXIN 500 MG: 500 CAPSULE ORAL at 19:17

## 2023-08-10 ASSESSMENT — PAIN - FUNCTIONAL ASSESSMENT: PAIN_FUNCTIONAL_ASSESSMENT: NONE - DENIES PAIN

## 2023-08-10 NOTE — ED TRIAGE NOTES
Bright red blood in urine, onset today.  Reports mild pain with urination, frequency, not going as much as normal.

## 2023-08-10 NOTE — ED PROVIDER NOTES
with hematuria. Return cautions given with instruction to return to emergency for fever chills/abdominal pain/back pain/nausea or vomiting. Vital Signs-Reviewed the patient's vital signs. [unfilled]    Records Reviewed:  Available old ER/admission charts reviewed    ED Course:   Initial assessment performed. The patients presenting problems have been discussed, and they are in agreement with the care plan formulated and outlined with them. I have encouraged them to ask questions as they arise throughout their visit. PROCEDURES      Disposition: Condition stable   DC- Adult Discharges: All of the diagnostic tests were reviewed and questions answered. Diagnosis, care plan and treatment options were discussed. understand instructions and will follow up as directed. The patients results have been reviewed with them. They have been counseled regarding their diagnosis. The patient verbally convey understanding and agreement of the signs, symptoms, diagnosis, treatment and prognosis and additionally agrees to follow up as recommended. They also agree with the care-plan and convey that all of their questions have been answered. I have also put together some discharge instructions for them that include: 1) educational information regarding their diagnosis, 2) how to care for their diagnosis at home, as well a 3) list of reasons why they would want to return to the ED prior to their follow-up appointment, should their condition change. PLAN:  1.       Medication List        START taking these medications      cephALEXin 500 MG capsule  Commonly known as: KEFLEX  Take 1 capsule by mouth 4 times daily for 7 days            ASK your doctor about these medications      atorvastatin 40 MG tablet  Commonly known as: LIPITOR     dilTIAZem 120 MG tablet  Commonly known as: CARDIZEM     empagliflozin 10 MG tablet  Commonly known as: JARDIANCE     famotidine 40 MG tablet  Commonly known as: PEPCID     lisinopril 10

## 2023-08-14 ENCOUNTER — TELEPHONE (OUTPATIENT)
Age: 73
End: 2023-08-14

## 2023-08-14 NOTE — TELEPHONE ENCOUNTER
Pt was seen at [facility] ER on 8/10 for blood in urine and needed to follow up with a urologist in 1 day

## 2023-09-09 LAB
BUN SERPL-MCNC: 18 MG/DL (ref 8–27)
BUN/CREAT SERPL: 16 (ref 10–24)
CALCIUM SERPL-MCNC: 9.6 MG/DL (ref 8.6–10.2)
CHLORIDE SERPL-SCNC: 99 MMOL/L (ref 96–106)
CO2 SERPL-SCNC: 24 MMOL/L (ref 20–29)
CREAT SERPL-MCNC: 1.12 MG/DL (ref 0.76–1.27)
EGFRCR SERPLBLD CKD-EPI 2021: 69 ML/MIN/1.73
EST. AVERAGE GLUCOSE BLD GHB EST-MCNC: 220 MG/DL
GLUCOSE SERPL-MCNC: 229 MG/DL (ref 70–99)
HBA1C MFR BLD: 9.3 % (ref 4.8–5.6)
POTASSIUM SERPL-SCNC: 4.8 MMOL/L (ref 3.5–5.2)
SODIUM SERPL-SCNC: 137 MMOL/L (ref 134–144)

## 2023-09-29 ENCOUNTER — OFFICE VISIT (OUTPATIENT)
Facility: CLINIC | Age: 73
End: 2023-09-29
Payer: MEDICARE

## 2023-09-29 VITALS
RESPIRATION RATE: 16 BRPM | HEIGHT: 67 IN | HEART RATE: 69 BPM | TEMPERATURE: 98.4 F | WEIGHT: 179.8 LBS | DIASTOLIC BLOOD PRESSURE: 71 MMHG | OXYGEN SATURATION: 97 % | BODY MASS INDEX: 28.22 KG/M2 | SYSTOLIC BLOOD PRESSURE: 121 MMHG

## 2023-09-29 DIAGNOSIS — E11.65 POORLY CONTROLLED DIABETES MELLITUS (HCC): ICD-10-CM

## 2023-09-29 DIAGNOSIS — Z71.6 TOBACCO ABUSE COUNSELING: ICD-10-CM

## 2023-09-29 DIAGNOSIS — K21.00 GASTROESOPHAGEAL REFLUX DISEASE WITH ESOPHAGITIS WITHOUT HEMORRHAGE: ICD-10-CM

## 2023-09-29 DIAGNOSIS — E10.65 TYPE 1 DIABETES MELLITUS WITH HYPERGLYCEMIA (HCC): ICD-10-CM

## 2023-09-29 DIAGNOSIS — I25.2 HX OF MYOCARDIAL INFARCTION, GREATER THAN 8 WEEKS: ICD-10-CM

## 2023-09-29 DIAGNOSIS — I11.9 MALIGNANT HYPERTENSIVE HEART DISEASE WITHOUT HEART FAILURE: Primary | ICD-10-CM

## 2023-09-29 DIAGNOSIS — E78.2 MIXED HYPERLIPIDEMIA: ICD-10-CM

## 2023-09-29 DIAGNOSIS — Z23 ENCOUNTER FOR IMMUNIZATION: ICD-10-CM

## 2023-09-29 DIAGNOSIS — E10.9 BRITTLE DIABETES MELLITUS (HCC): ICD-10-CM

## 2023-09-29 DIAGNOSIS — R73.9 HYPERGLYCEMIA: ICD-10-CM

## 2023-09-29 DIAGNOSIS — F17.200 TOBACCO DEPENDENCE: ICD-10-CM

## 2023-09-29 LAB
GLUCOSE, POC: 273 MG/DL
HBA1C MFR BLD: 10.2 %

## 2023-09-29 PROCEDURE — 3074F SYST BP LT 130 MM HG: CPT | Performed by: FAMILY MEDICINE

## 2023-09-29 PROCEDURE — G8427 DOCREV CUR MEDS BY ELIG CLIN: HCPCS | Performed by: FAMILY MEDICINE

## 2023-09-29 PROCEDURE — 2022F DILAT RTA XM EVC RTNOPTHY: CPT | Performed by: FAMILY MEDICINE

## 2023-09-29 PROCEDURE — 3046F HEMOGLOBIN A1C LEVEL >9.0%: CPT | Performed by: FAMILY MEDICINE

## 2023-09-29 PROCEDURE — 4004F PT TOBACCO SCREEN RCVD TLK: CPT | Performed by: FAMILY MEDICINE

## 2023-09-29 PROCEDURE — 90694 VACC AIIV4 NO PRSRV 0.5ML IM: CPT | Performed by: FAMILY MEDICINE

## 2023-09-29 PROCEDURE — G8419 CALC BMI OUT NRM PARAM NOF/U: HCPCS | Performed by: FAMILY MEDICINE

## 2023-09-29 PROCEDURE — 1123F ACP DISCUSS/DSCN MKR DOCD: CPT | Performed by: FAMILY MEDICINE

## 2023-09-29 PROCEDURE — 3017F COLORECTAL CA SCREEN DOC REV: CPT | Performed by: FAMILY MEDICINE

## 2023-09-29 PROCEDURE — 3078F DIAST BP <80 MM HG: CPT | Performed by: FAMILY MEDICINE

## 2023-09-29 RX ORDER — PIOGLITAZONEHYDROCHLORIDE 45 MG/1
45 TABLET ORAL DAILY
Qty: 90 TABLET | Refills: 0 | Status: SHIPPED | OUTPATIENT
Start: 2023-09-29

## 2023-09-29 RX ORDER — NICOTINE 21 MG/24HR
1 PATCH, TRANSDERMAL 24 HOURS TRANSDERMAL DAILY
Qty: 42 PATCH | Refills: 0 | Status: SHIPPED | OUTPATIENT
Start: 2023-09-29 | End: 2023-11-10

## 2023-10-10 RX ORDER — INSULIN ASPART 100 [IU]/ML
INJECTION, SUSPENSION SUBCUTANEOUS
Qty: 45 ML | Refills: 1 | Status: SHIPPED | OUTPATIENT
Start: 2023-10-10

## 2023-12-29 ENCOUNTER — OFFICE VISIT (OUTPATIENT)
Facility: CLINIC | Age: 73
End: 2023-12-29
Payer: MEDICARE

## 2023-12-29 VITALS
TEMPERATURE: 97.9 F | RESPIRATION RATE: 16 BRPM | HEIGHT: 67 IN | BODY MASS INDEX: 29.35 KG/M2 | OXYGEN SATURATION: 95 % | WEIGHT: 187 LBS | SYSTOLIC BLOOD PRESSURE: 165 MMHG | DIASTOLIC BLOOD PRESSURE: 81 MMHG | HEART RATE: 77 BPM

## 2023-12-29 DIAGNOSIS — E78.2 MIXED HYPERLIPIDEMIA: ICD-10-CM

## 2023-12-29 DIAGNOSIS — Z86.010 HX OF COLONIC POLYP: ICD-10-CM

## 2023-12-29 DIAGNOSIS — E11.65 POORLY CONTROLLED DIABETES MELLITUS (HCC): ICD-10-CM

## 2023-12-29 DIAGNOSIS — Z71.6 TOBACCO ABUSE COUNSELING: ICD-10-CM

## 2023-12-29 DIAGNOSIS — K21.00 GASTROESOPHAGEAL REFLUX DISEASE WITH ESOPHAGITIS WITHOUT HEMORRHAGE: ICD-10-CM

## 2023-12-29 DIAGNOSIS — F17.200 TOBACCO DEPENDENCE: ICD-10-CM

## 2023-12-29 DIAGNOSIS — E10.9 TYPE 1 DIABETES MELLITUS WITHOUT COMPLICATION (HCC): ICD-10-CM

## 2023-12-29 DIAGNOSIS — I11.9 MALIGNANT HYPERTENSIVE HEART DISEASE WITHOUT HEART FAILURE: Primary | ICD-10-CM

## 2023-12-29 DIAGNOSIS — I25.2 HX OF MYOCARDIAL INFARCTION, GREATER THAN 8 WEEKS: ICD-10-CM

## 2023-12-29 LAB — HBA1C MFR BLD: 10.2 %

## 2023-12-29 PROCEDURE — 3046F HEMOGLOBIN A1C LEVEL >9.0%: CPT | Performed by: FAMILY MEDICINE

## 2023-12-29 PROCEDURE — 3079F DIAST BP 80-89 MM HG: CPT | Performed by: FAMILY MEDICINE

## 2023-12-29 PROCEDURE — 4004F PT TOBACCO SCREEN RCVD TLK: CPT | Performed by: FAMILY MEDICINE

## 2023-12-29 PROCEDURE — 99214 OFFICE O/P EST MOD 30 MIN: CPT | Performed by: FAMILY MEDICINE

## 2023-12-29 PROCEDURE — 3077F SYST BP >= 140 MM HG: CPT | Performed by: FAMILY MEDICINE

## 2023-12-29 PROCEDURE — G8484 FLU IMMUNIZE NO ADMIN: HCPCS | Performed by: FAMILY MEDICINE

## 2023-12-29 PROCEDURE — 2022F DILAT RTA XM EVC RTNOPTHY: CPT | Performed by: FAMILY MEDICINE

## 2023-12-29 PROCEDURE — 83036 HEMOGLOBIN GLYCOSYLATED A1C: CPT | Performed by: FAMILY MEDICINE

## 2023-12-29 PROCEDURE — G8419 CALC BMI OUT NRM PARAM NOF/U: HCPCS | Performed by: FAMILY MEDICINE

## 2023-12-29 PROCEDURE — 1123F ACP DISCUSS/DSCN MKR DOCD: CPT | Performed by: FAMILY MEDICINE

## 2023-12-29 PROCEDURE — 3017F COLORECTAL CA SCREEN DOC REV: CPT | Performed by: FAMILY MEDICINE

## 2023-12-29 PROCEDURE — G8427 DOCREV CUR MEDS BY ELIG CLIN: HCPCS | Performed by: FAMILY MEDICINE

## 2023-12-29 RX ORDER — FAMOTIDINE 40 MG/1
40 TABLET, FILM COATED ORAL DAILY
Qty: 90 TABLET | Refills: 0 | Status: SHIPPED | OUTPATIENT
Start: 2023-12-29

## 2023-12-29 RX ORDER — METOPROLOL SUCCINATE 50 MG/1
50 TABLET, EXTENDED RELEASE ORAL DAILY
Qty: 90 TABLET | Refills: 0 | Status: SHIPPED | OUTPATIENT
Start: 2023-12-29

## 2024-02-05 ENCOUNTER — OFFICE VISIT (OUTPATIENT)
Age: 74
End: 2024-02-05
Payer: MEDICARE

## 2024-02-05 VITALS
HEART RATE: 60 BPM | WEIGHT: 198.4 LBS | HEIGHT: 67 IN | OXYGEN SATURATION: 96 % | TEMPERATURE: 98.3 F | BODY MASS INDEX: 31.14 KG/M2 | DIASTOLIC BLOOD PRESSURE: 70 MMHG | SYSTOLIC BLOOD PRESSURE: 119 MMHG

## 2024-02-05 DIAGNOSIS — E11.59 TYPE 2 DIABETES MELLITUS WITH OTHER CIRCULATORY COMPLICATIONS (HCC): ICD-10-CM

## 2024-02-05 DIAGNOSIS — E11.65 POORLY CONTROLLED DIABETES MELLITUS (HCC): ICD-10-CM

## 2024-02-05 DIAGNOSIS — Z72.0 TOBACCO USE: ICD-10-CM

## 2024-02-05 DIAGNOSIS — I10 ESSENTIAL HYPERTENSION: Primary | ICD-10-CM

## 2024-02-05 LAB — GLUCOSE, POC: 148 MG/DL

## 2024-02-05 PROCEDURE — 3078F DIAST BP <80 MM HG: CPT | Performed by: STUDENT IN AN ORGANIZED HEALTH CARE EDUCATION/TRAINING PROGRAM

## 2024-02-05 PROCEDURE — 3074F SYST BP LT 130 MM HG: CPT | Performed by: STUDENT IN AN ORGANIZED HEALTH CARE EDUCATION/TRAINING PROGRAM

## 2024-02-05 PROCEDURE — 3017F COLORECTAL CA SCREEN DOC REV: CPT | Performed by: STUDENT IN AN ORGANIZED HEALTH CARE EDUCATION/TRAINING PROGRAM

## 2024-02-05 PROCEDURE — 4004F PT TOBACCO SCREEN RCVD TLK: CPT | Performed by: STUDENT IN AN ORGANIZED HEALTH CARE EDUCATION/TRAINING PROGRAM

## 2024-02-05 PROCEDURE — 2022F DILAT RTA XM EVC RTNOPTHY: CPT | Performed by: STUDENT IN AN ORGANIZED HEALTH CARE EDUCATION/TRAINING PROGRAM

## 2024-02-05 PROCEDURE — G8417 CALC BMI ABV UP PARAM F/U: HCPCS | Performed by: STUDENT IN AN ORGANIZED HEALTH CARE EDUCATION/TRAINING PROGRAM

## 2024-02-05 PROCEDURE — 3046F HEMOGLOBIN A1C LEVEL >9.0%: CPT | Performed by: STUDENT IN AN ORGANIZED HEALTH CARE EDUCATION/TRAINING PROGRAM

## 2024-02-05 PROCEDURE — 1123F ACP DISCUSS/DSCN MKR DOCD: CPT | Performed by: STUDENT IN AN ORGANIZED HEALTH CARE EDUCATION/TRAINING PROGRAM

## 2024-02-05 PROCEDURE — 82962 GLUCOSE BLOOD TEST: CPT | Performed by: STUDENT IN AN ORGANIZED HEALTH CARE EDUCATION/TRAINING PROGRAM

## 2024-02-05 PROCEDURE — 99204 OFFICE O/P NEW MOD 45 MIN: CPT | Performed by: STUDENT IN AN ORGANIZED HEALTH CARE EDUCATION/TRAINING PROGRAM

## 2024-02-05 PROCEDURE — G8484 FLU IMMUNIZE NO ADMIN: HCPCS | Performed by: STUDENT IN AN ORGANIZED HEALTH CARE EDUCATION/TRAINING PROGRAM

## 2024-02-05 PROCEDURE — G8427 DOCREV CUR MEDS BY ELIG CLIN: HCPCS | Performed by: STUDENT IN AN ORGANIZED HEALTH CARE EDUCATION/TRAINING PROGRAM

## 2024-02-05 NOTE — PATIENT INSTRUCTIONS
Increase jardiance to 25 mg daily   Continue other medications  Clarify whther you are taking sitagliptin(januvia) or linagliptin (trajenta  Taking evening insulin with dinner  And morning insulin with breakfast  Contact the company for sensor

## 2024-02-05 NOTE — PROGRESS NOTES
Chief Complaint   Patient presents with    New Patient     diabetes    /70 (Site: Right Upper Arm, Position: Sitting, Cuff Size: Medium Adult)   Pulse 60   Temp 98.3 °F (36.8 °C) (Oral)   Ht 1.702 m (5' 7\")   Wt 90 kg (198 lb 6.4 oz)   SpO2 96%   BMI 31.07 kg/m²  1. \"Have you been to the ER, urgent care clinic since your last visit?  Hospitalized since your last visit?\" No    2. \"Have you seen or consulted any other health care providers outside of the [facility] since your last visit?\" No     3. For patients aged 45-75: Has the patient had a colonoscopy / FIT/ Cologuard? Yes - Care Gap present. Most recent result on file      If the patient is female:    4. For patients aged 40-74: Has the patient had a mammogram within the past 2 years? NA - based on age or sex      5. For patients aged 21-65: Has the patient had a pap smear? NA - based on age or sex  
take 1 tablet by mouth twice a day with meals for 90 DAYS    pioglitazone (ACTOS) 45 MG tablet Take 1 tablet by mouth daily for 90 days    RA ASPIRIN EC 81 MG EC tablet take 1 tablet by mouth daily for 90 DAYS    atorvastatin (LIPITOR) 40 MG tablet Take 1 tablet by mouth daily    dilTIAZem (CARDIZEM) 120 MG tablet Take 1 tablet by mouth daily    empagliflozin (JARDIANCE) 10 MG tablet Take by mouth daily    lisinopril (PRINIVIL;ZESTRIL) 10 MG tablet Take by mouth daily    polyethylene glycol (GOLYTELY) 236 g solution Used as directed     No current facility-administered medications for this visit.           Review of Systems: Per HPI    Physical Examination:  Blood pressure 119/70, pulse 60, temperature 98.3 °F (36.8 °C), temperature source Oral, height 1.702 m (5' 7\"), weight 90 kg (198 lb 6.4 oz), SpO2 96 %. Body mass index is 31.07 kg/m².  General: pleasant, no distress, good eye contact  HEENT: no pallor, no periorbital edema, EOMI  Neck: supple, no thyromegaly, no nodules  Cardiovascular: regular,  normal S1 and S2,   Respiratory: clear to auscultation bilaterally  Gastrointestinal: soft, nontender,   Musculoskeletal: Decreased monofilament sensation on right foot   Neurological: alert and oriented  Psychiatric: normal mood and affect    Data Reviewed:     Lab Results   Component Value Date/Time    GLUCPOC 273 09/29/2023 03:54 PM      Lab Results   Component Value Date/Time    BUN 18 09/08/2023 01:56 PM     09/08/2023 01:56 PM    K 4.8 09/08/2023 01:56 PM    CL 99 09/08/2023 01:56 PM    CO2 24 09/08/2023 01:56 PM       No results found for this visit on 02/05/24.      Assessment/Plan:     1. Essential hypertension    2. Poorly controlled diabetes mellitus (HCC)    3. Type 2 diabetes mellitus with other circulatory complications (HCC)        No orders of the defined types were placed in this encounter.      Type 2 DM   -poorly controlled and complicated by CAD neuropathy   -A1c  10.2 % 12/23

## 2024-03-05 DIAGNOSIS — E11.59 TYPE 2 DIABETES MELLITUS WITH OTHER CIRCULATORY COMPLICATIONS (HCC): ICD-10-CM

## 2024-03-07 ENCOUNTER — OFFICE VISIT (OUTPATIENT)
Age: 74
End: 2024-03-07

## 2024-03-07 DIAGNOSIS — E10.65 TYPE 1 DIABETES MELLITUS WITH HYPERGLYCEMIA (HCC): Primary | ICD-10-CM

## 2024-03-07 NOTE — PROGRESS NOTES
[facility]  Diabetes Self-Management Education & Support Program    Reason for Referral: DSMES  Referral Source: Mckenzie Del Angel,*  Services requested: DSMES       ASSESSMENT    From my perspective, the participant would benefit from DSMES specifically related to reducing risks, healthy eating, monitoring, taking medications, physical activity, healthy coping, and problem solving. Will adapt DSMES program to build on participant's skills score, confidence score, and preparedness score as noted in the Diabetes Skills, Confidence, and Preparedness Index.    During the program, we will focus on providing DSMES that specifically addresses participant's interest in reducing risks, healthy eating, monitoring, taking medications, physical activity, healthy coping, and problem solving, as shown by their reported readiness to change.    The participant would be best served by attending weekly group class series.    Diabetes Self-Management Education Follow-up Visit: April 2024- Participant will attend class 2 only.  He has had diabetes education in the past and only has 2 hours left.  I will work with him 1:1  to give him as much education as possible within that time limit.       Clinical Presentation  Ananth Noe is a 74 y.o.  male referred for diabetes self-management education. Participant has Type 2 DM on insulin for 1-10 years. Family history positive for diabetes. Patient reports receiving DSMES services in the past. Most recent A1c value:   Lab Results   Component Value Date/Time    DSJ7HXRT 10.2 12/29/2023 03:30 PM    LABA1C 9.3 09/08/2023 01:56 PM         Diabetes-related medications:  Current dosing: metFORMIN - 1000 MG  NovoLOG Mix 70/30 FlexPen - 100 UNIT/ML  pioglitazone - 45 MG  SITagliptin - 100 MG    Blood Pressure Management  lisinopril - 10 MG      Lipid Management  atorvastatin - 40 MG      Clot Prevention  This patient does not have an active

## 2024-03-29 DIAGNOSIS — E11.65 POORLY CONTROLLED DIABETES MELLITUS (HCC): ICD-10-CM

## 2024-08-13 ENCOUNTER — OFFICE VISIT (OUTPATIENT)
Facility: CLINIC | Age: 74
End: 2024-08-13
Payer: MEDICARE

## 2024-08-13 VITALS
TEMPERATURE: 98.3 F | BODY MASS INDEX: 27.28 KG/M2 | OXYGEN SATURATION: 96 % | SYSTOLIC BLOOD PRESSURE: 116 MMHG | HEIGHT: 67 IN | WEIGHT: 173.8 LBS | HEART RATE: 60 BPM | RESPIRATION RATE: 16 BRPM | DIASTOLIC BLOOD PRESSURE: 63 MMHG

## 2024-08-13 DIAGNOSIS — E11.65 TYPE 2 DIABETES MELLITUS WITH HYPERGLYCEMIA, WITH LONG-TERM CURRENT USE OF INSULIN (HCC): Primary | ICD-10-CM

## 2024-08-13 DIAGNOSIS — Z79.4 TYPE 2 DIABETES MELLITUS WITH HYPERGLYCEMIA, WITH LONG-TERM CURRENT USE OF INSULIN (HCC): Primary | ICD-10-CM

## 2024-08-13 DIAGNOSIS — Z71.6 TOBACCO ABUSE COUNSELING: ICD-10-CM

## 2024-08-13 DIAGNOSIS — K21.00 GASTROESOPHAGEAL REFLUX DISEASE WITH ESOPHAGITIS WITHOUT HEMORRHAGE: ICD-10-CM

## 2024-08-13 DIAGNOSIS — I11.9 MALIGNANT HYPERTENSIVE HEART DISEASE WITHOUT HEART FAILURE: ICD-10-CM

## 2024-08-13 DIAGNOSIS — I25.2 HX OF MYOCARDIAL INFARCTION, GREATER THAN 8 WEEKS: ICD-10-CM

## 2024-08-13 DIAGNOSIS — F17.200 TOBACCO DEPENDENCE: ICD-10-CM

## 2024-08-13 DIAGNOSIS — E78.2 MIXED HYPERLIPIDEMIA: ICD-10-CM

## 2024-08-13 DIAGNOSIS — E11.65 POORLY CONTROLLED DIABETES MELLITUS (HCC): ICD-10-CM

## 2024-08-13 DIAGNOSIS — Z91.199 NONCOMPLIANCE WITH DIABETES TREATMENT: ICD-10-CM

## 2024-08-13 DIAGNOSIS — Z91.148 NONCOMPLIANCE WITH MEDICATION REGIMEN: ICD-10-CM

## 2024-08-13 LAB
GLUCOSE, POC: 264 MG/DL
HBA1C MFR BLD: 11.4 %

## 2024-08-13 PROCEDURE — 82962 GLUCOSE BLOOD TEST: CPT | Performed by: FAMILY MEDICINE

## 2024-08-13 PROCEDURE — 99214 OFFICE O/P EST MOD 30 MIN: CPT | Performed by: FAMILY MEDICINE

## 2024-08-13 PROCEDURE — 3078F DIAST BP <80 MM HG: CPT | Performed by: FAMILY MEDICINE

## 2024-08-13 PROCEDURE — 3074F SYST BP LT 130 MM HG: CPT | Performed by: FAMILY MEDICINE

## 2024-08-13 PROCEDURE — 1123F ACP DISCUSS/DSCN MKR DOCD: CPT | Performed by: FAMILY MEDICINE

## 2024-08-13 PROCEDURE — 83036 HEMOGLOBIN GLYCOSYLATED A1C: CPT | Performed by: FAMILY MEDICINE

## 2024-08-13 RX ORDER — ATORVASTATIN CALCIUM 40 MG/1
40 TABLET, FILM COATED ORAL DAILY
Qty: 90 TABLET | Refills: 0 | Status: SHIPPED | OUTPATIENT
Start: 2024-08-13

## 2024-08-13 RX ORDER — FAMOTIDINE 40 MG/1
40 TABLET, FILM COATED ORAL DAILY
Qty: 90 TABLET | Refills: 0 | Status: SHIPPED | OUTPATIENT
Start: 2024-08-13

## 2024-08-13 RX ORDER — PIOGLITAZONEHYDROCHLORIDE 45 MG/1
45 TABLET ORAL DAILY
Qty: 90 TABLET | Refills: 0 | Status: SHIPPED | OUTPATIENT
Start: 2024-08-13

## 2024-08-13 RX ORDER — METOPROLOL SUCCINATE 50 MG/1
50 TABLET, EXTENDED RELEASE ORAL DAILY
Qty: 90 TABLET | Refills: 0 | Status: SHIPPED | OUTPATIENT
Start: 2024-08-13

## 2024-08-13 RX ORDER — INSULIN ASPART 100 [IU]/ML
75 INJECTION, SUSPENSION SUBCUTANEOUS 2 TIMES DAILY WITH MEALS
Qty: 45 ML | Refills: 1 | Status: SHIPPED | OUTPATIENT
Start: 2024-08-13

## 2024-08-13 ASSESSMENT — PATIENT HEALTH QUESTIONNAIRE - PHQ9
SUM OF ALL RESPONSES TO PHQ QUESTIONS 1-9: 0
SUM OF ALL RESPONSES TO PHQ QUESTIONS 1-9: 0
1. LITTLE INTEREST OR PLEASURE IN DOING THINGS: NOT AT ALL
2. FEELING DOWN, DEPRESSED OR HOPELESS: NOT AT ALL
SUM OF ALL RESPONSES TO PHQ QUESTIONS 1-9: 0
SUM OF ALL RESPONSES TO PHQ QUESTIONS 1-9: 0
SUM OF ALL RESPONSES TO PHQ9 QUESTIONS 1 & 2: 0

## 2024-08-13 NOTE — PROGRESS NOTES
Ananth Noe is a 74 y.o. male and presents with Follow-up Chronic Condition and Hypertension (Pt here for a follow up for hypertension and in need of med refills)  .  Hypertension     with a hx of HTN and Diabetes  74 y.o. Patient here on a routine follow up with no recent symptoms    Subjective:  Cardiovascular Review:  The patient has hypertension   Diet and Lifestyle: generally follows a low fat low cholesterol diet, generally follows a low sodium diet, exercises sporadically  Home BP Monitoring: is not measured at home.  Pertinent ROS: taking medications as instructed, no medication side effects noted, no TIA's, no chest pain on exertion, no dyspnea on exertion, no swelling of ankles.     Review of Systems  Review of Systems - Negative       Past Medical History:   Diagnosis Date    Abdominal pain 1/11/2021    Abscess of head, except face 1/11/2021    Automatic implantable cardiac defibrillator in situ 1/11/2021    Cholelithiasis 1/11/2021    Elevated blood pressure reading 1/11/2021    Epigastric pain 1/11/2021    Essential hypertension 1/11/2021    Gastroesophageal reflux disease 1/11/2021    History of adenomatous polyp of colon 1/11/2021    Hyperglycemia 1/11/2021    Hyperlipidemia 1/11/2021    Lesion of skin of scalp 1/11/2021    Myocardial infarction (HCC) 1/11/2021    Systolic murmur 1/11/2021    Tobacco dependence 1/11/2021    Type II diabetes mellitus (HCC) 1/11/2021     Past Surgical History:   Procedure Laterality Date    CARDIAC DEFIBRILLATOR PLACEMENT      COLONOSCOPY      COLONOSCOPY N/A 6/1/2021    COLONOSCOPY performed by Summer Galloway MD at [facility] ENDOSCOPY    PROSTATE SURGERY       Social History     Socioeconomic History    Marital status:      Spouse name: None    Number of children: None    Years of education: None    Highest education level: None   Tobacco Use    Smoking status: Every Day     Current packs/day: 0.25     Average packs/day: 0.3 packs/day for 25.0 years (6.3

## 2024-08-13 NOTE — PROGRESS NOTES
Chief Complaint   Patient presents with    Follow-up Chronic Condition    Hypertension     Pt here for a follow up for hypertension and in need of med refills     /63 (Site: Left Upper Arm, Position: Sitting, Cuff Size: Medium Adult)   Pulse 60   Temp 98.3 °F (36.8 °C) (Oral)   Resp 16   Ht 1.702 m (5' 7\")   Wt 78.8 kg (173 lb 12.8 oz)   SpO2 96%   BMI 27.22 kg/m²     \"Have you been to the ER, urgent care clinic since your last visit?  Hospitalized since your last visit?\"    NO    “Have you seen or consulted any other health care providers outside of [facility] since your last visit?”    NO            Click Here for Release of Records Request

## 2024-08-16 ENCOUNTER — TELEPHONE (OUTPATIENT)
Facility: CLINIC | Age: 74
End: 2024-08-16

## 2024-08-16 NOTE — TELEPHONE ENCOUNTER
Patient called in..    States he hasn't received his medication yet. I advised patient that the mail order pharmacies could take up to 5days or more to be delivered.     Patient asked if he does not get his medication by Monday could he get a hold over supply sent to     [facility] [address] [place] [address] - [address] [place] [address] - P [phone] - F [phone]  [address] [place] [id number]  Phone: [phone] Fax: [phone]     [phone]

## 2024-08-20 ENCOUNTER — TELEPHONE (OUTPATIENT)
Facility: CLINIC | Age: 74
End: 2024-08-20

## 2024-08-20 NOTE — TELEPHONE ENCOUNTER
Called and spoke with pt, medications that MD filled on his 8/13 office visit that went to [facility] pharmacy can not be filled because pt no longer has [employer] insurance and [facility] only uses [employer], pt needs all his meds to be sent now to [facility] pharmacy in [place], meds were sent in to [facility]

## 2024-08-22 RX ORDER — INSULIN LISPRO 100 [IU]/ML
75 INJECTION, SUSPENSION SUBCUTANEOUS 2 TIMES DAILY WITH MEALS
Qty: 5 ADJUSTABLE DOSE PRE-FILLED PEN SYRINGE | Refills: 3 | Status: SHIPPED | OUTPATIENT
Start: 2024-08-22

## 2024-08-22 RX ORDER — INSULIN ASPART 100 [IU]/ML
INJECTION, SOLUTION INTRAVENOUS; SUBCUTANEOUS
Qty: 45 ML | Refills: 1 | OUTPATIENT
Start: 2024-08-22

## 2024-08-28 RX ORDER — INSULIN LISPRO 100 [IU]/ML
INJECTION, SUSPENSION SUBCUTANEOUS
Qty: 15 ML | Refills: 3 | OUTPATIENT
Start: 2024-08-28

## 2024-08-29 ENCOUNTER — TELEPHONE (OUTPATIENT)
Facility: CLINIC | Age: 74
End: 2024-08-29

## 2024-08-29 RX ORDER — INSULIN LISPRO 100 [IU]/ML
35 INJECTION, SUSPENSION SUBCUTANEOUS 2 TIMES DAILY WITH MEALS
Qty: 5 ADJUSTABLE DOSE PRE-FILLED PEN SYRINGE | Refills: 3 | Status: SHIPPED | OUTPATIENT
Start: 2024-08-29

## 2024-08-29 NOTE — TELEPHONE ENCOUNTER
Call came in from pharmacist from [facility], clarification is needed on the script sent in for the Humalog 75/25 kwikpen, directions say to inject .75 mls which is a lot and this is a short acting insulin and if he means inject 75 units that is a lot for this short acting insulin, would like this clarified with the MD first and get a call back, will forward message to MD

## 2024-09-11 ENCOUNTER — OFFICE VISIT (OUTPATIENT)
Age: 74
End: 2024-09-11
Payer: COMMERCIAL

## 2024-09-11 VITALS
BODY MASS INDEX: 28.16 KG/M2 | OXYGEN SATURATION: 98 % | WEIGHT: 179.4 LBS | HEIGHT: 67 IN | RESPIRATION RATE: 18 BRPM | TEMPERATURE: 99 F | DIASTOLIC BLOOD PRESSURE: 78 MMHG | HEART RATE: 61 BPM | SYSTOLIC BLOOD PRESSURE: 153 MMHG

## 2024-09-11 DIAGNOSIS — Z72.0 TOBACCO USE: ICD-10-CM

## 2024-09-11 DIAGNOSIS — E11.59 TYPE 2 DIABETES MELLITUS WITH OTHER CIRCULATORY COMPLICATIONS (HCC): Primary | ICD-10-CM

## 2024-09-11 DIAGNOSIS — I10 ESSENTIAL HYPERTENSION: ICD-10-CM

## 2024-09-11 DIAGNOSIS — Z91.199 NON-COMPLIANCE: ICD-10-CM

## 2024-09-11 LAB — GLUCOSE, POC: 283 MG/DL

## 2024-09-11 PROCEDURE — 3077F SYST BP >= 140 MM HG: CPT | Performed by: STUDENT IN AN ORGANIZED HEALTH CARE EDUCATION/TRAINING PROGRAM

## 2024-09-11 PROCEDURE — 1123F ACP DISCUSS/DSCN MKR DOCD: CPT | Performed by: STUDENT IN AN ORGANIZED HEALTH CARE EDUCATION/TRAINING PROGRAM

## 2024-09-11 PROCEDURE — G2211 COMPLEX E/M VISIT ADD ON: HCPCS | Performed by: STUDENT IN AN ORGANIZED HEALTH CARE EDUCATION/TRAINING PROGRAM

## 2024-09-11 PROCEDURE — 99214 OFFICE O/P EST MOD 30 MIN: CPT | Performed by: STUDENT IN AN ORGANIZED HEALTH CARE EDUCATION/TRAINING PROGRAM

## 2024-09-11 PROCEDURE — 82962 GLUCOSE BLOOD TEST: CPT | Performed by: STUDENT IN AN ORGANIZED HEALTH CARE EDUCATION/TRAINING PROGRAM

## 2024-09-11 PROCEDURE — 3078F DIAST BP <80 MM HG: CPT | Performed by: STUDENT IN AN ORGANIZED HEALTH CARE EDUCATION/TRAINING PROGRAM

## 2024-09-20 ENCOUNTER — OFFICE VISIT (OUTPATIENT)
Age: 74
End: 2024-09-20

## 2024-09-20 DIAGNOSIS — Z79.4 TYPE 2 DIABETES MELLITUS WITH HYPERGLYCEMIA, WITH LONG-TERM CURRENT USE OF INSULIN (HCC): Primary | ICD-10-CM

## 2024-09-20 DIAGNOSIS — E11.65 TYPE 2 DIABETES MELLITUS WITH HYPERGLYCEMIA, WITH LONG-TERM CURRENT USE OF INSULIN (HCC): Primary | ICD-10-CM

## 2024-10-25 ENCOUNTER — TELEPHONE (OUTPATIENT)
Facility: CLINIC | Age: 74
End: 2024-10-25

## 2024-10-25 NOTE — TELEPHONE ENCOUNTER
Spoke to Sandor joshua [facility], had follow up questions for the nurse/provider in regards to Chronic Illness paperwork that was submitted by PCP. Call back number is  [phone]  Case# [id number]

## 2024-11-11 ENCOUNTER — OFFICE VISIT (OUTPATIENT)
Age: 74
End: 2024-11-11

## 2024-11-11 VITALS
HEIGHT: 67 IN | OXYGEN SATURATION: 98 % | RESPIRATION RATE: 16 BRPM | DIASTOLIC BLOOD PRESSURE: 77 MMHG | SYSTOLIC BLOOD PRESSURE: 141 MMHG | BODY MASS INDEX: 30.98 KG/M2 | WEIGHT: 197.4 LBS | TEMPERATURE: 98 F | HEART RATE: 60 BPM

## 2024-11-11 DIAGNOSIS — E11.59 TYPE 2 DIABETES MELLITUS WITH OTHER CIRCULATORY COMPLICATIONS (HCC): ICD-10-CM

## 2024-11-11 DIAGNOSIS — I10 ESSENTIAL HYPERTENSION: ICD-10-CM

## 2024-11-11 DIAGNOSIS — E11.59 TYPE 2 DIABETES MELLITUS WITH OTHER CIRCULATORY COMPLICATIONS (HCC): Primary | ICD-10-CM

## 2024-11-11 LAB
GLUCOSE, POC: 169 MG/DL
HBA1C MFR BLD: 8 %

## 2024-11-11 RX ORDER — PIOGLITAZONEHYDROCHLORIDE 30 MG/1
30 TABLET ORAL DAILY
Qty: 30 TABLET | Refills: 3 | Status: SHIPPED | OUTPATIENT
Start: 2024-11-11

## 2024-11-11 NOTE — PROGRESS NOTES
Name: susie rodríguez  YOB: 1950  Report Period: 10/29/2024 - 11/11/2024 (14 days)  Generated: 11/11/2024  % Time CGM Active: 90%      Glucose Statistics and Targets  Average Glucose: 188 mg/dL  Glucose Management Indicator (GMI): 7.8%  Glucose Variability (%CV): 33.6%  Target Range: 70 - 180 mg/dL      Time in Ranges  Very High: >250 mg/dL --- 17%  High: 181 - 250 mg/dL --- 36%  Target Range: 70 - 180 mg/dL --- 46%  Low: 54 - 69 mg/dL --- 1%  Very Low: <54 mg/dL --- 0%    
\"Have you been to the ER, urgent care clinic since your last visit?  Hospitalized since your last visit?\"    NO    “Have you seen or consulted any other health care providers outside our system since your last visit?”    NO      “Have you had a diabetic eye exam?”    NO     No diabetic eye exam on file       A1C 8.0    Chief Complaint   Patient presents with    Follow-up    Diabetes     BP (!) 141/77 (Site: Left Upper Arm, Position: Sitting, Cuff Size: Large Adult)   Pulse 60   Temp 98 °F (36.7 °C) (Temporal)   Resp 16   Ht 1.702 m (5' 7\")   Wt 89.5 kg (197 lb 6.4 oz)   SpO2 98%   BMI 30.92 kg/m²        
with meals for 90 DAYS    metoprolol succinate (TOPROL XL) 50 MG extended release tablet Take 1 tablet by mouth daily    pioglitazone (ACTOS) 45 MG tablet Take 1 tablet by mouth daily for 90 days    RA ASPIRIN EC 81 MG EC tablet take 1 tablet by mouth daily for 90 DAYS    nicotine (NICODERM CQ) 21 MG/24HR Place 1 patch onto the skin daily     No current facility-administered medications for this visit.           Review of Systems: Per HPI    Physical Examination:  Blood pressure (!) 141/77, pulse 60, temperature 98 °F (36.7 °C), temperature source Temporal, resp. rate 16, height 1.702 m (5' 7\"), weight 89.5 kg (197 lb 6.4 oz), SpO2 98%. Body mass index is 30.92 kg/m².  General: pleasant, no distress, good eye contact  HEENT: no pallor, no periorbital edema, EOMI  Neck: supple, no thyromegaly, no nodules  Cardiovascular: regular,  normal S1 and S2,   Respiratory: clear to auscultation bilaterally  Gastrointestinal: soft, nontender,   Musculoskeletal: Decreased monofilament sensation on right foot   Neurological: alert and oriented  Psychiatric: normal mood and affect    Data Reviewed:     Lab Results   Component Value Date/Time    GLUCPOC 169 11/11/2024 02:53 PM    LDL 50 07/19/2022 12:36 PM      Lab Results   Component Value Date/Time    BUN 18 09/08/2023 01:56 PM     09/08/2023 01:56 PM    K 4.8 09/08/2023 01:56 PM    CL 99 09/08/2023 01:56 PM    CO2 24 09/08/2023 01:56 PM       Results for orders placed or performed in visit on 11/11/24   AMB POC HEMOGLOBIN A1C   Result Value Ref Range    Hemoglobin A1C, POC 8.0 %   AMB POC GLUCOSE BLOOD, BY GLUCOSE MONITORING DEVICE   Result Value Ref Range    Glucose,  MG/DL         Assessment/Plan:     1. Type 2 diabetes mellitus with other circulatory complications (HCC)        Orders Placed This Encounter   Procedures    AMB POC HEMOGLOBIN A1C    AMB POC GLUCOSE BLOOD, BY GLUCOSE MONITORING DEVICE       Type 2 DM   -poorly controlled and complicated by CAD

## 2024-11-14 ENCOUNTER — OFFICE VISIT (OUTPATIENT)
Facility: CLINIC | Age: 74
End: 2024-11-14

## 2024-11-14 VITALS
RESPIRATION RATE: 16 BRPM | TEMPERATURE: 98.8 F | BODY MASS INDEX: 28.82 KG/M2 | WEIGHT: 183.6 LBS | HEART RATE: 60 BPM | DIASTOLIC BLOOD PRESSURE: 78 MMHG | OXYGEN SATURATION: 97 % | HEIGHT: 67 IN | SYSTOLIC BLOOD PRESSURE: 159 MMHG

## 2024-11-14 DIAGNOSIS — I11.9 MALIGNANT HYPERTENSIVE HEART DISEASE WITHOUT HEART FAILURE: ICD-10-CM

## 2024-11-14 DIAGNOSIS — K21.00 GASTROESOPHAGEAL REFLUX DISEASE WITH ESOPHAGITIS WITHOUT HEMORRHAGE: ICD-10-CM

## 2024-11-14 DIAGNOSIS — E78.2 MIXED HYPERLIPIDEMIA: ICD-10-CM

## 2024-11-14 DIAGNOSIS — E11.65 TYPE 2 DIABETES MELLITUS WITH HYPERGLYCEMIA, WITH LONG-TERM CURRENT USE OF INSULIN (HCC): ICD-10-CM

## 2024-11-14 DIAGNOSIS — Z95.810 IMPLANTABLE CARDIOVERTER-DEFIBRILLATOR (ICD) IN SITU: Primary | ICD-10-CM

## 2024-11-14 DIAGNOSIS — Z79.4 TYPE 2 DIABETES MELLITUS WITH HYPERGLYCEMIA, WITH LONG-TERM CURRENT USE OF INSULIN (HCC): ICD-10-CM

## 2024-11-14 DIAGNOSIS — Z00.00 MEDICARE ANNUAL WELLNESS VISIT, SUBSEQUENT: ICD-10-CM

## 2024-11-14 DIAGNOSIS — Z71.89 ACP (ADVANCE CARE PLANNING): ICD-10-CM

## 2024-11-14 LAB
ALBUMIN SERPL-MCNC: 4.5 G/DL (ref 3.8–4.8)
ALBUMIN/CREAT UR: 4 MG/G CREAT (ref 0–29)
ALP SERPL-CCNC: 102 IU/L (ref 44–121)
ALT SERPL-CCNC: 19 IU/L (ref 0–44)
AST SERPL-CCNC: 25 IU/L (ref 0–40)
BASOPHILS # BLD AUTO: 0 X10E3/UL (ref 0–0.2)
BASOPHILS NFR BLD AUTO: 1 %
BILIRUB SERPL-MCNC: 0.4 MG/DL (ref 0–1.2)
BUN SERPL-MCNC: 17 MG/DL (ref 8–27)
BUN/CREAT SERPL: 15 (ref 10–24)
CALCIUM SERPL-MCNC: 9.3 MG/DL (ref 8.6–10.2)
CHLORIDE SERPL-SCNC: 104 MMOL/L (ref 96–106)
CHOLEST SERPL-MCNC: 126 MG/DL (ref 100–199)
CO2 SERPL-SCNC: 21 MMOL/L (ref 20–29)
CREAT SERPL-MCNC: 1.11 MG/DL (ref 0.76–1.27)
CREAT UR-MCNC: 86.7 MG/DL
EGFRCR SERPLBLD CKD-EPI 2021: 70 ML/MIN/1.73
EOSINOPHIL # BLD AUTO: 0.1 X10E3/UL (ref 0–0.4)
EOSINOPHIL NFR BLD AUTO: 2 %
ERYTHROCYTE [DISTWIDTH] IN BLOOD BY AUTOMATED COUNT: 12.5 % (ref 11.6–15.4)
GLOBULIN SER CALC-MCNC: 2.4 G/DL (ref 1.5–4.5)
GLUCOSE SERPL-MCNC: 174 MG/DL (ref 70–99)
HCT VFR BLD AUTO: 45.5 % (ref 37.5–51)
HDLC SERPL-MCNC: 56 MG/DL
HGB BLD-MCNC: 14.7 G/DL (ref 13–17.7)
IMM GRANULOCYTES # BLD AUTO: 0 X10E3/UL (ref 0–0.1)
IMM GRANULOCYTES NFR BLD AUTO: 0 %
LDLC SERPL CALC-MCNC: 56 MG/DL (ref 0–99)
LYMPHOCYTES # BLD AUTO: 1.9 X10E3/UL (ref 0.7–3.1)
LYMPHOCYTES NFR BLD AUTO: 32 %
MCH RBC QN AUTO: 30.2 PG (ref 26.6–33)
MCHC RBC AUTO-ENTMCNC: 32.3 G/DL (ref 31.5–35.7)
MCV RBC AUTO: 93 FL (ref 79–97)
MICROALBUMIN UR-MCNC: 3.2 UG/ML
MONOCYTES # BLD AUTO: 0.5 X10E3/UL (ref 0.1–0.9)
MONOCYTES NFR BLD AUTO: 8 %
NEUTROPHILS # BLD AUTO: 3.4 X10E3/UL (ref 1.4–7)
NEUTROPHILS NFR BLD AUTO: 57 %
PLATELET # BLD AUTO: 160 X10E3/UL (ref 150–450)
POTASSIUM SERPL-SCNC: 4.1 MMOL/L (ref 3.5–5.2)
PROT SERPL-MCNC: 6.9 G/DL (ref 6–8.5)
RBC # BLD AUTO: 4.87 X10E6/UL (ref 4.14–5.8)
SODIUM SERPL-SCNC: 143 MMOL/L (ref 134–144)
TRIGL SERPL-MCNC: 70 MG/DL (ref 0–149)
VLDLC SERPL CALC-MCNC: 14 MG/DL (ref 5–40)
WBC # BLD AUTO: 6 X10E3/UL (ref 3.4–10.8)

## 2024-11-14 RX ORDER — INSULIN LISPRO 100 [IU]/ML
35 INJECTION, SUSPENSION SUBCUTANEOUS 2 TIMES DAILY WITH MEALS
Qty: 5 ADJUSTABLE DOSE PRE-FILLED PEN SYRINGE | Refills: 3 | Status: SHIPPED | OUTPATIENT
Start: 2024-11-14

## 2024-11-14 RX ORDER — ATORVASTATIN CALCIUM 40 MG/1
40 TABLET, FILM COATED ORAL DAILY
Qty: 90 TABLET | Refills: 0 | Status: SHIPPED | OUTPATIENT
Start: 2024-11-14

## 2024-11-14 RX ORDER — METOPROLOL SUCCINATE 50 MG/1
50 TABLET, EXTENDED RELEASE ORAL DAILY
Qty: 90 TABLET | Refills: 0 | Status: SHIPPED | OUTPATIENT
Start: 2024-11-14

## 2024-11-14 ASSESSMENT — LIFESTYLE VARIABLES
HOW OFTEN DO YOU HAVE A DRINK CONTAINING ALCOHOL: MONTHLY OR LESS
HOW MANY STANDARD DRINKS CONTAINING ALCOHOL DO YOU HAVE ON A TYPICAL DAY: 1 OR 2

## 2024-11-14 ASSESSMENT — PATIENT HEALTH QUESTIONNAIRE - PHQ9
1. LITTLE INTEREST OR PLEASURE IN DOING THINGS: NOT AT ALL
SUM OF ALL RESPONSES TO PHQ QUESTIONS 1-9: 0
SUM OF ALL RESPONSES TO PHQ QUESTIONS 1-9: 0
2. FEELING DOWN, DEPRESSED OR HOPELESS: NOT AT ALL
SUM OF ALL RESPONSES TO PHQ QUESTIONS 1-9: 0
SUM OF ALL RESPONSES TO PHQ QUESTIONS 1-9: 0
SUM OF ALL RESPONSES TO PHQ9 QUESTIONS 1 & 2: 0

## 2024-11-14 NOTE — PROGRESS NOTES
Ananth Noe is a 74 y.o. male and presents with Medicare AWV and Diabetes (Pt here for his medicare AWV)  .  Diabetes     with a hx of HTN and Diabetes  74 y.o. Patient here on a routine follow up with no recent symptoms    Subjective:  Cardiovascular Review:  The patient has hypertension   Diet and Lifestyle: generally follows a low fat low cholesterol diet, generally follows a low sodium diet, exercises sporadically  Home BP Monitoring: is not measured at home.  Pertinent ROS: taking medications as instructed, no medication side effects noted, no TIA's, no chest pain on exertion, no dyspnea on exertion, no swelling of ankles.     Review of Systems  Review of Systems - Musculoskeletal ROS: positive for - joint pain       Past Medical History:   Diagnosis Date    Abdominal pain 1/11/2021    Abscess of head, except face 1/11/2021    Automatic implantable cardiac defibrillator in situ 1/11/2021    Cholelithiasis 1/11/2021    Elevated blood pressure reading 1/11/2021    Epigastric pain 1/11/2021    Essential hypertension 1/11/2021    Gastroesophageal reflux disease 1/11/2021    History of adenomatous polyp of colon 1/11/2021    Hyperglycemia 1/11/2021    Hyperlipidemia 1/11/2021    Lesion of skin of scalp 1/11/2021    Myocardial infarction (HCC) 1/11/2021    Systolic murmur 1/11/2021    Tobacco dependence 1/11/2021    Type II diabetes mellitus (HCC) 1/11/2021     Past Surgical History:   Procedure Laterality Date    CARDIAC DEFIBRILLATOR PLACEMENT      COLONOSCOPY      COLONOSCOPY N/A 6/1/2021    COLONOSCOPY performed by Summer Galloway MD at [facility] ENDOSCOPY    PROSTATE SURGERY       Social History     Socioeconomic History    Marital status:      Spouse name: None    Number of children: None    Years of education: None    Highest education level: None   Tobacco Use    Smoking status: Every Day     Current packs/day: 0.25     Average packs/day: 0.3 packs/day for 25.0 years (6.3 ttl pk-yrs)     Types:

## 2024-11-14 NOTE — PATIENT INSTRUCTIONS
copay.    Some of these benefits include a comprehensive review of your medical history including lifestyle, illnesses that may run in your family, and various assessments and screenings as appropriate.    After reviewing your medical record and screening and assessments performed today your provider may have ordered immunizations, labs, imaging, and/or referrals for you.  A list of these orders (if applicable) as well as your Preventive Care list are included within your After Visit Summary for your review.    Other Preventive Recommendations:    A preventive eye exam performed by an eye specialist is recommended every 1-2 years to screen for glaucoma; cataracts, macular degeneration, and other eye disorders.  A preventive dental visit is recommended every 6 months.  Try to get at least 150 minutes of exercise per week or 10,000 steps per day on a pedometer .  Order or download the FREE \"Exercise & Physical Activity: Your Everyday Guide\" from [employer] [place] [employer]. Call 1-783.871.2483 or search [employer] [place] [employer] online.  You need 3397-7877 mg of calcium and 7734-7574 IU of vitamin D per day. It is possible to meet your calcium requirement with diet alone, but a vitamin D supplement is usually necessary to meet this goal.  When exposed to the sun, use a sunscreen that protects against both UVA and UVB radiation with an SPF of 30 or greater. Reapply every 2 to 3 hours or after sweating, drying off with a towel, or swimming.  Always wear a seat belt when traveling in a car. Always wear a helmet when riding a bicycle or motorcycle.

## 2024-11-14 NOTE — PROGRESS NOTES
Chief Complaint   Patient presents with    Medicare AW    Diabetes     Pt here for his medicare AWV     BP (!) 159/78 (Site: Right Upper Arm, Position: Sitting, Cuff Size: Large Adult)   Pulse 60   Temp 98.8 °F (37.1 °C) (Oral)   Resp 16   Ht 1.702 m (5' 7\")   Wt 83.3 kg (183 lb 9.6 oz)   SpO2 97%   BMI 28.76 kg/m²     \"Have you been to the ER, urgent care clinic since your last visit?  Hospitalized since your last visit?\"    NO    “Have you seen or consulted any other health care providers outside our system since your last visit?”    NO      “Have you had a diabetic eye exam?”    NO     No diabetic eye exam on file

## 2024-11-15 PROBLEM — Z00.00 MEDICARE ANNUAL WELLNESS VISIT, SUBSEQUENT: Status: ACTIVE | Noted: 2024-11-15

## 2024-11-15 PROBLEM — Z71.89 ACP (ADVANCE CARE PLANNING): Status: ACTIVE | Noted: 2024-11-15

## 2024-11-16 NOTE — RESULT ENCOUNTER NOTE
Hello,  Can we please let patient know labs ( blood  count, cholesterol, kidney number) look okay    Thank you,  Dr. Del Angel

## 2024-12-15 PROBLEM — Z00.00 MEDICARE ANNUAL WELLNESS VISIT, SUBSEQUENT: Status: RESOLVED | Noted: 2024-11-15 | Resolved: 2024-12-15

## 2024-12-27 ENCOUNTER — APPOINTMENT (OUTPATIENT)
Facility: HOSPITAL | Age: 74
End: 2024-12-27
Payer: OTHER MISCELLANEOUS

## 2024-12-27 ENCOUNTER — HOSPITAL ENCOUNTER (EMERGENCY)
Facility: HOSPITAL | Age: 74
Discharge: ANOTHER HOSPITAL | End: 2024-12-27
Attending: STUDENT IN AN ORGANIZED HEALTH CARE EDUCATION/TRAINING PROGRAM
Payer: OTHER MISCELLANEOUS

## 2024-12-27 VITALS
HEIGHT: 67 IN | RESPIRATION RATE: 18 BRPM | HEART RATE: 78 BPM | BODY MASS INDEX: 28.25 KG/M2 | TEMPERATURE: 98.8 F | OXYGEN SATURATION: 97 % | WEIGHT: 180 LBS | SYSTOLIC BLOOD PRESSURE: 154 MMHG | DIASTOLIC BLOOD PRESSURE: 63 MMHG

## 2024-12-27 DIAGNOSIS — M25.562 ACUTE PAIN OF BOTH KNEES: ICD-10-CM

## 2024-12-27 DIAGNOSIS — M54.2 NECK PAIN: ICD-10-CM

## 2024-12-27 DIAGNOSIS — R51.9 NONINTRACTABLE HEADACHE, UNSPECIFIED CHRONICITY PATTERN, UNSPECIFIED HEADACHE TYPE: ICD-10-CM

## 2024-12-27 DIAGNOSIS — M25.561 ACUTE PAIN OF BOTH KNEES: ICD-10-CM
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
## 2024-12-27 LAB
ALBUMIN SERPL-MCNC: 4.5 G/DL (ref 3.5–5)
ALBUMIN/GLOB SERPL: 1.2 (ref 1.1–2.2)
ALP SERPL-CCNC: 110 U/L (ref 45–117)
ALT SERPL-CCNC: 43 U/L (ref 12–78)
AMPHET UR QL SCN: NEGATIVE
ANION GAP SERPL CALC-SCNC: 14 MMOL/L (ref 2–12)
APPEARANCE UR: CLEAR
AST SERPL W P-5'-P-CCNC: 75 U/L (ref 15–37)
BACTERIA URNS QL MICRO: NEGATIVE /HPF
BARBITURATES UR QL SCN: NEGATIVE
BENZODIAZ UR QL: NEGATIVE
BILIRUB SERPL-MCNC: 0.8 MG/DL (ref 0.2–1)
BILIRUB UR QL: NEGATIVE
BUN SERPL-MCNC: 25 MG/DL (ref 6–20)
BUN/CREAT SERPL: 18 (ref 12–20)
BUPRENORPHINE UR QL: NEGATIVE
CA-I BLD-MCNC: 9.7 MG/DL (ref 8.5–10.1)
CANNABINOIDS UR QL SCN: POSITIVE
CHLORIDE SERPL-SCNC: 103 MMOL/L (ref 97–108)
CO2 SERPL-SCNC: 23 MMOL/L (ref 21–32)
COCAINE UR QL SCN: NEGATIVE
COLOR UR: ABNORMAL
CREAT SERPL-MCNC: 1.41 MG/DL (ref 0.7–1.3)
EPITH CASTS URNS QL MICRO: ABNORMAL /LPF
ERYTHROCYTE [DISTWIDTH] IN BLOOD BY AUTOMATED COUNT: 12.4 % (ref 11.5–14.5)
ETHANOL SERPL-MCNC: 24 MG/DL (ref 0–0.08)
GLOBULIN SER CALC-MCNC: 3.7 G/DL (ref 2–4)
GLUCOSE SERPL-MCNC: 147 MG/DL (ref 65–100)
GLUCOSE UR STRIP.AUTO-MCNC: >1000 MG/DL
HCT VFR BLD AUTO: 46.3 % (ref 36.6–50.3)
HGB BLD-MCNC: 15.5 G/DL (ref 12.1–17)
HGB UR QL STRIP: ABNORMAL
INR PPP: 1.1 (ref 0.9–1.1)
KETONES UR QL STRIP.AUTO: 15 MG/DL
LEUKOCYTE ESTERASE UR QL STRIP.AUTO: NEGATIVE
Lab: ABNORMAL
MCH RBC QN AUTO: 29.4 PG (ref 26–34)
MCHC RBC AUTO-ENTMCNC: 33.5 G/DL (ref 30–36.5)
MCV RBC AUTO: 87.7 FL (ref 80–99)
METHADONE UR QL: NEGATIVE
METHAMPHET UR QL: NEGATIVE
NITRITE UR QL STRIP.AUTO: NEGATIVE
OPIATES UR QL: NEGATIVE
OXYCODONE UR QL SCN: NEGATIVE
PCP UR QL: NEGATIVE
PH UR STRIP: 5 (ref 5–8)
PLATELET # BLD AUTO: 149 K/UL (ref 150–400)
PMV BLD AUTO: 11.6 FL (ref 8.9–12.9)
POTASSIUM SERPL-SCNC: 4.6 MMOL/L (ref 3.5–5.1)
PROPOXYPH UR QL: NEGATIVE
PROT SERPL-MCNC: 8.2 G/DL (ref 6.4–8.2)
PROT UR STRIP-MCNC: ABNORMAL MG/DL
PROTHROMBIN TIME: 10.6 SEC (ref 9–11.1)
RBC # BLD AUTO: 5.28 M/UL (ref 4.1–5.7)
RBC #/AREA URNS HPF: ABNORMAL /HPF (ref 0–5)
SODIUM SERPL-SCNC: 140 MMOL/L (ref 136–145)
SP GR UR REFRACTOMETRY: 1.01 (ref 1–1.03)
TRICYCLICS UR QL: NEGATIVE
UROBILINOGEN UR QL STRIP.AUTO: 0.1 EU/DL (ref 0.2–1)
WBC # BLD AUTO: 10.9 K/UL (ref 4.1–11.1)
WBC URNS QL MICRO: ABNORMAL /HPF (ref 0–4)

## 2024-12-27 PROCEDURE — 73562 X-RAY EXAM OF KNEE 3: CPT

## 2024-12-27 PROCEDURE — 70450 CT HEAD/BRAIN W/O DYE: CPT

## 2024-12-27 PROCEDURE — 73630 X-RAY EXAM OF FOOT: CPT

## 2024-12-27 PROCEDURE — 71260 CT THORAX DX C+: CPT

## 2024-12-27 PROCEDURE — 6360000004 HC RX CONTRAST MEDICATION: Performed by: STUDENT IN AN ORGANIZED HEALTH CARE EDUCATION/TRAINING PROGRAM

## 2024-12-27 PROCEDURE — 85027 COMPLETE CBC AUTOMATED: CPT

## 2024-12-27 PROCEDURE — 36415 COLL VENOUS BLD VENIPUNCTURE: CPT

## 2024-12-27 PROCEDURE — 71045 X-RAY EXAM CHEST 1 VIEW: CPT

## 2024-12-27 PROCEDURE — 81001 URINALYSIS AUTO W/SCOPE: CPT

## 2024-12-27 PROCEDURE — 99285 EMERGENCY DEPT VISIT HI MDM: CPT

## 2024-12-27 PROCEDURE — 85610 PROTHROMBIN TIME: CPT

## 2024-12-27 PROCEDURE — 80307 DRUG TEST PRSMV CHEM ANLYZR: CPT

## 2024-12-27 PROCEDURE — 2580000003 HC RX 258: Performed by: STUDENT IN AN ORGANIZED HEALTH CARE EDUCATION/TRAINING PROGRAM

## 2024-12-27 PROCEDURE — 80053 COMPREHEN METABOLIC PANEL: CPT

## 2024-12-27 PROCEDURE — 72125 CT NECK SPINE W/O DYE: CPT

## 2024-12-27 PROCEDURE — 72170 X-RAY EXAM OF PELVIS: CPT

## 2024-12-27 PROCEDURE — 82077 ASSAY SPEC XCP UR&BREATH IA: CPT

## 2024-12-27 PROCEDURE — 74177 CT ABD & PELVIS W/CONTRAST: CPT

## 2024-12-27 PROCEDURE — 96365 THER/PROPH/DIAG IV INF INIT: CPT

## 2024-12-27 PROCEDURE — 96375 TX/PRO/DX INJ NEW DRUG ADDON: CPT

## 2024-12-27 PROCEDURE — 6360000002 HC RX W HCPCS: Performed by: STUDENT IN AN ORGANIZED HEALTH CARE EDUCATION/TRAINING PROGRAM

## 2024-12-27 RX ORDER — FENTANYL CITRATE 50 UG/ML
50 INJECTION, SOLUTION INTRAMUSCULAR; INTRAVENOUS ONCE
Status: COMPLETED | OUTPATIENT
Start: 2024-12-27 | End: 2024-12-27

## 2024-12-27 RX ORDER — IOPAMIDOL 755 MG/ML
100 INJECTION, SOLUTION INTRAVASCULAR
Status: COMPLETED | OUTPATIENT
Start: 2024-12-27 | End: 2024-12-27

## 2024-12-27 RX ORDER — LABETALOL HYDROCHLORIDE 5 MG/ML
10 INJECTION, SOLUTION INTRAVENOUS ONCE
Status: COMPLETED | OUTPATIENT
Start: 2024-12-27 | End: 2024-12-27

## 2024-12-27 RX ADMIN — FENTANYL CITRATE 50 MCG: 50 INJECTION INTRAMUSCULAR; INTRAVENOUS at 14:33

## 2024-12-27 RX ADMIN — NICARDIPINE HYDROCHLORIDE 2.5 MG/HR: 2.5 INJECTION, SOLUTION INTRAVENOUS at 16:07

## 2024-12-27 RX ADMIN — LABETALOL HYDROCHLORIDE 10 MG: 5 INJECTION, SOLUTION INTRAVENOUS at 14:33

## 2024-12-27 RX ADMIN — IOPAMIDOL 100 ML: 755 INJECTION, SOLUTION INTRAVENOUS at 13:47

## 2024-12-27 ASSESSMENT — PAIN DESCRIPTION - LOCATION: LOCATION: HEAD;LEG

## 2024-12-27 ASSESSMENT — PAIN SCALES - GENERAL: PAINLEVEL_OUTOF10: 6

## 2024-12-27 ASSESSMENT — PAIN DESCRIPTION - PAIN TYPE: TYPE: ACUTE PAIN

## 2024-12-27 ASSESSMENT — PAIN DESCRIPTION - DESCRIPTORS: DESCRIPTORS: ACHING

## 2024-12-27 ASSESSMENT — PAIN DESCRIPTION - ORIENTATION: ORIENTATION: RIGHT;LEFT

## 2024-12-27 ASSESSMENT — PAIN - FUNCTIONAL ASSESSMENT: PAIN_FUNCTIONAL_ASSESSMENT: PREVENTS OR INTERFERES SOME ACTIVE ACTIVITIES AND ADLS
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
## 2025-01-08 ENCOUNTER — OFFICE VISIT (OUTPATIENT)
Facility: CLINIC | Age: 75
End: 2025-01-08
Payer: MEDICARE

## 2025-01-08 VITALS
DIASTOLIC BLOOD PRESSURE: 80 MMHG | TEMPERATURE: 97.1 F | RESPIRATION RATE: 16 BRPM | BODY MASS INDEX: 28.25 KG/M2 | HEIGHT: 67 IN | WEIGHT: 180 LBS | OXYGEN SATURATION: 96 % | SYSTOLIC BLOOD PRESSURE: 132 MMHG | HEART RATE: 60 BPM

## 2025-01-08 DIAGNOSIS — I11.9 MALIGNANT HYPERTENSIVE HEART DISEASE WITHOUT HEART FAILURE: ICD-10-CM

## 2025-01-08 DIAGNOSIS — F17.200 TOBACCO DEPENDENCE: ICD-10-CM

## 2025-01-08 DIAGNOSIS — Z01.818 PREOPERATIVE CLEARANCE: Primary | ICD-10-CM

## 2025-01-08 DIAGNOSIS — L03.116 LEFT LEG CELLULITIS: ICD-10-CM

## 2025-01-08 DIAGNOSIS — I61.9 BRAIN BLEED (HCC): ICD-10-CM

## 2025-01-08 DIAGNOSIS — Z71.6 TOBACCO ABUSE COUNSELING: ICD-10-CM
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
## 2025-01-08 PROCEDURE — 1123F ACP DISCUSS/DSCN MKR DOCD: CPT | Performed by: FAMILY MEDICINE

## 2025-01-08 PROCEDURE — 3075F SYST BP GE 130 - 139MM HG: CPT | Performed by: FAMILY MEDICINE

## 2025-01-08 PROCEDURE — 99214 OFFICE O/P EST MOD 30 MIN: CPT | Performed by: FAMILY MEDICINE

## 2025-01-08 PROCEDURE — G8427 DOCREV CUR MEDS BY ELIG CLIN: HCPCS | Performed by: FAMILY MEDICINE

## 2025-01-08 PROCEDURE — G8417 CALC BMI ABV UP PARAM F/U: HCPCS | Performed by: FAMILY MEDICINE

## 2025-01-08 PROCEDURE — 1159F MED LIST DOCD IN RCRD: CPT | Performed by: FAMILY MEDICINE

## 2025-01-08 PROCEDURE — 3079F DIAST BP 80-89 MM HG: CPT | Performed by: FAMILY MEDICINE

## 2025-01-08 PROCEDURE — 1125F AMNT PAIN NOTED PAIN PRSNT: CPT | Performed by: FAMILY MEDICINE

## 2025-01-08 PROCEDURE — 4004F PT TOBACCO SCREEN RCVD TLK: CPT | Performed by: FAMILY MEDICINE

## 2025-01-08 PROCEDURE — 3017F COLORECTAL CA SCREEN DOC REV: CPT | Performed by: FAMILY MEDICINE

## 2025-01-08 RX ORDER — CLINDAMYCIN HYDROCHLORIDE 300 MG/1
300 CAPSULE ORAL 3 TIMES DAILY
Qty: 30 CAPSULE | Refills: 0 | Status: SHIPPED | OUTPATIENT
Start: 2025-01-08 | End: 2025-01-18

## 2025-01-08 SDOH — ECONOMIC STABILITY: FOOD INSECURITY: WITHIN THE PAST 12 MONTHS, YOU WORRIED THAT YOUR FOOD WOULD RUN OUT BEFORE YOU GOT MONEY TO BUY MORE.: NEVER TRUE

## 2025-01-08 SDOH — ECONOMIC STABILITY: FOOD INSECURITY: WITHIN THE PAST 12 MONTHS, THE FOOD YOU BOUGHT JUST DIDN'T LAST AND YOU DIDN'T HAVE MONEY TO GET MORE.: NEVER TRUE

## 2025-01-08 ASSESSMENT — PATIENT HEALTH QUESTIONNAIRE - PHQ9
SUM OF ALL RESPONSES TO PHQ QUESTIONS 1-9: 0
SUM OF ALL RESPONSES TO PHQ QUESTIONS 1-9: 0
SUM OF ALL RESPONSES TO PHQ9 QUESTIONS 1 & 2: 0
SUM OF ALL RESPONSES TO PHQ QUESTIONS 1-9: 0
1. LITTLE INTEREST OR PLEASURE IN DOING THINGS: NOT AT ALL
SUM OF ALL RESPONSES TO PHQ QUESTIONS 1-9: 0
2. FEELING DOWN, DEPRESSED OR HOPELESS: NOT AT ALL
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
## 2025-01-08 NOTE — PROGRESS NOTES
Chief Complaint   Patient presents with    Follow-Up from Hospital     /80   Pulse 60   Temp 97.1 °F (36.2 °C)   Resp 16   Ht 1.702 m (5' 7\")   Wt 81.6 kg (180 lb)   SpO2 96%   BMI 28.19 kg/m²   \"Have you been to the ER, urgent care clinic since your last visit?  Hospitalized since your last visit?\"    YES    “Have you seen or consulted any other health care providers outside our system since your last visit?”    NO      “Have you had a diabetic eye exam?”    NO     No diabetic eye exam on file

## 2025-01-09 LAB
BASOPHILS # BLD AUTO: 0 X10E3/UL (ref 0–0.2)
BASOPHILS NFR BLD AUTO: 0 %
BUN SERPL-MCNC: 25 MG/DL (ref 8–27)
BUN/CREAT SERPL: 18 (ref 10–24)
CALCIUM SERPL-MCNC: 9.8 MG/DL (ref 8.6–10.2)
CHLORIDE SERPL-SCNC: 104 MMOL/L (ref 96–106)
CO2 SERPL-SCNC: 21 MMOL/L (ref 20–29)
CREAT SERPL-MCNC: 1.36 MG/DL (ref 0.76–1.27)
EGFRCR SERPLBLD CKD-EPI 2021: 55 ML/MIN/1.73
EOSINOPHIL # BLD AUTO: 0.1 X10E3/UL (ref 0–0.4)
EOSINOPHIL NFR BLD AUTO: 2 %
ERYTHROCYTE [DISTWIDTH] IN BLOOD BY AUTOMATED COUNT: 12.8 % (ref 11.6–15.4)
GLUCOSE SERPL-MCNC: 127 MG/DL (ref 70–99)
HCT VFR BLD AUTO: 43.9 % (ref 37.5–51)
HGB BLD-MCNC: 14.4 G/DL (ref 13–17.7)
IMM GRANULOCYTES # BLD AUTO: 0 X10E3/UL (ref 0–0.1)
IMM GRANULOCYTES NFR BLD AUTO: 1 %
LYMPHOCYTES # BLD AUTO: 1.9 X10E3/UL (ref 0.7–3.1)
LYMPHOCYTES NFR BLD AUTO: 27 %
MCH RBC QN AUTO: 29.6 PG (ref 26.6–33)
MCHC RBC AUTO-ENTMCNC: 32.8 G/DL (ref 31.5–35.7)
MCV RBC AUTO: 90 FL (ref 79–97)
MONOCYTES # BLD AUTO: 0.6 X10E3/UL (ref 0.1–0.9)
MONOCYTES NFR BLD AUTO: 8 %
NEUTROPHILS # BLD AUTO: 4.4 X10E3/UL (ref 1.4–7)
NEUTROPHILS NFR BLD AUTO: 62 %
PLATELET # BLD AUTO: 250 X10E3/UL (ref 150–450)
POTASSIUM SERPL-SCNC: 4.8 MMOL/L (ref 3.5–5.2)
RBC # BLD AUTO: 4.86 X10E6/UL (ref 4.14–5.8)
SODIUM SERPL-SCNC: 142 MMOL/L (ref 134–144)
WBC # BLD AUTO: 7 X10E3/UL (ref 3.4–10.8)

## 2025-01-10 LAB — VIT B12 SERPL-MCNC: 602 PG/ML (ref 232–1245)

## 2025-02-07 PROBLEM — Z01.818 PREOPERATIVE CLEARANCE: Status: RESOLVED | Noted: 2025-01-08 | Resolved: 2025-02-07

## 2025-02-10 ENCOUNTER — TELEPHONE (OUTPATIENT)
Facility: CLINIC | Age: 75
End: 2025-02-10

## 2025-02-10 SDOH — HEALTH STABILITY: PHYSICAL HEALTH: ON AVERAGE, HOW MANY DAYS PER WEEK DO YOU ENGAGE IN MODERATE TO STRENUOUS EXERCISE (LIKE A BRISK WALK)?: 0 DAYS

## 2025-02-10 ASSESSMENT — PATIENT HEALTH QUESTIONNAIRE - PHQ9
2. FEELING DOWN, DEPRESSED OR HOPELESS: NOT AT ALL
SUM OF ALL RESPONSES TO PHQ QUESTIONS 1-9: 0
SUM OF ALL RESPONSES TO PHQ QUESTIONS 1-9: 0
1. LITTLE INTEREST OR PLEASURE IN DOING THINGS: NOT AT ALL
SUM OF ALL RESPONSES TO PHQ QUESTIONS 1-9: 0
SUM OF ALL RESPONSES TO PHQ QUESTIONS 1-9: 0

## 2025-02-10 ASSESSMENT — LIFESTYLE VARIABLES
HOW MANY STANDARD DRINKS CONTAINING ALCOHOL DO YOU HAVE ON A TYPICAL DAY: 1 OR 2
HOW OFTEN DO YOU HAVE A DRINK CONTAINING ALCOHOL: MONTHLY OR LESS

## 2025-02-12 ENCOUNTER — OFFICE VISIT (OUTPATIENT)
Age: 75
End: 2025-02-12

## 2025-02-12 VITALS
SYSTOLIC BLOOD PRESSURE: 123 MMHG | HEART RATE: 62 BPM | RESPIRATION RATE: 16 BRPM | BODY MASS INDEX: 26.1 KG/M2 | DIASTOLIC BLOOD PRESSURE: 75 MMHG | HEIGHT: 67 IN | TEMPERATURE: 97.9 F | WEIGHT: 166.3 LBS | OXYGEN SATURATION: 98 %

## 2025-02-12 DIAGNOSIS — Z79.4 TYPE 2 DIABETES MELLITUS WITH HYPERGLYCEMIA, WITH LONG-TERM CURRENT USE OF INSULIN (HCC): Primary | ICD-10-CM

## 2025-02-12 DIAGNOSIS — E11.65 TYPE 2 DIABETES MELLITUS WITH HYPERGLYCEMIA, WITH LONG-TERM CURRENT USE OF INSULIN (HCC): Primary | ICD-10-CM

## 2025-02-12 LAB
GLUCOSE, POC: 152 MG/DL
HBA1C MFR BLD: 9 %

## 2025-02-12 RX ORDER — INSULIN LISPRO 100 [IU]/ML
INJECTION, SUSPENSION SUBCUTANEOUS
Qty: 5 ADJUSTABLE DOSE PRE-FILLED PEN SYRINGE | Refills: 3 | Status: SHIPPED | OUTPATIENT
Start: 2025-02-12

## 2025-02-12 NOTE — PROGRESS NOTES
\"Have you been to the ER, urgent care clinic since your last visit?  Hospitalized since your last visit?\"    NO    “Have you seen or consulted any other health care providers outside our system since your last visit?”    NO      “Have you had a diabetic eye exam?”    NO     No diabetic eye exam on file         A1C 9.0    Chief Complaint   Patient presents with    Follow-up    Diabetes     /75 (Site: Left Upper Arm, Position: Sitting, Cuff Size: Large Adult)   Pulse 62   Temp 97.9 °F (36.6 °C) (Temporal)   Resp 16   Ht 1.702 m (5' 7\")   Wt 75.4 kg (166 lb 4.8 oz)   SpO2 98%   BMI 26.05 kg/m²

## 2025-02-12 NOTE — PROGRESS NOTES
[facility]                                                                                    Mckenzie Del Angel M.D          Patient Information  Date:2/12/2025  Name : Ananth Noe 74 y.o.     YOB: 1950         Referred by: Baldo Moe MD       Chief Complaint   Patient presents with    Follow-up    Diabetes       History of Present Illness: Ananth Noe is a 74 y.o. male with type 2 DM, HTN, who presented to follow up.       2- presents for follow up. Reports doing ok.       11-12-24 presents for follow up   Doing better     Last seen 2/2024   Cancelled appt after that   9-11-24  Unable to clarify medications  Did not get meter  No hypoglycemia symptoms          Type 2 diabetes mellitus    Glucometer reading:  Results for orders placed or performed in visit on 02/12/25   AMB POC HEMOGLOBIN A1C   Result Value Ref Range    Hemoglobin A1C, POC 9.0 %   AMB POC GLUCOSE BLOOD, BY GLUCOSE MONITORING DEVICE   Result Value Ref Range    Glucose,  MG/DL         · Diagnosis:10 years   · Family history of diabetes Mellitus : father  · Current treatment:  Novolog 70/30 mix 60 units with breakfast and 60 units at bedtime, actos 45 mg, metformin 1 g bid and jardiance   · Past treatment: None   · Glucose checks checking few times a month  · Hyperglycemia:  yes  · Hypoglycemia: no   · Meals per day: 3  ---Breakfast : 8- 9 am sausage eggs   ----Lunch :skips   ----Dinner: chicken dumpling, rice   -----Snacks: yes   -----Drinks: apple juice   · Exercise: walking   Steroids: none   · DM related hospitalizations: non    Smoking: yes   Family history of coronary artery disease/stroke: Father had MI 74 -75     Complications of DM:  · CAD: MI and s/p ICD   · CVA: no  · PVD: no  · Amputations: no   · Retinopathy: 2023 January    · Gastropathy: no  · Nephropathy: no  · Neuropathy: no   Sees podiatrist:Yes     Medications:  · Statin: lipitor  · ACE-I: lisinopril  ·

## 2025-04-01 ENCOUNTER — TELEPHONE (OUTPATIENT)
Facility: CLINIC | Age: 75
End: 2025-04-01

## 2025-04-01 DIAGNOSIS — R06.09 OTHER FORM OF DYSPNEA: ICD-10-CM

## 2025-04-01 DIAGNOSIS — Z71.6 TOBACCO ABUSE COUNSELING: ICD-10-CM

## 2025-04-01 DIAGNOSIS — F17.200 TOBACCO DEPENDENCE: Primary | ICD-10-CM
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
## 2025-04-03 ENCOUNTER — OFFICE VISIT (OUTPATIENT)
Facility: CLINIC | Age: 75
End: 2025-04-03
Payer: MEDICARE

## 2025-04-03 VITALS
TEMPERATURE: 98.3 F | WEIGHT: 169 LBS | OXYGEN SATURATION: 97 % | HEART RATE: 60 BPM | DIASTOLIC BLOOD PRESSURE: 68 MMHG | BODY MASS INDEX: 26.53 KG/M2 | HEIGHT: 67 IN | SYSTOLIC BLOOD PRESSURE: 133 MMHG | RESPIRATION RATE: 16 BRPM

## 2025-04-03 DIAGNOSIS — I25.2 HX OF MYOCARDIAL INFARCTION, GREATER THAN 8 WEEKS: ICD-10-CM

## 2025-04-03 DIAGNOSIS — Z71.6 TOBACCO ABUSE COUNSELING: ICD-10-CM

## 2025-04-03 DIAGNOSIS — K21.00 GASTROESOPHAGEAL REFLUX DISEASE WITH ESOPHAGITIS WITHOUT HEMORRHAGE: ICD-10-CM

## 2025-04-03 DIAGNOSIS — F17.200 TOBACCO DEPENDENCE: ICD-10-CM

## 2025-04-03 DIAGNOSIS — R06.09 DYSPNEA ON EXERTION: ICD-10-CM

## 2025-04-03 DIAGNOSIS — F10.10 ALCOHOL ABUSE: ICD-10-CM

## 2025-04-03 DIAGNOSIS — E11.65 POORLY CONTROLLED DIABETES MELLITUS (HCC): Primary | ICD-10-CM

## 2025-04-03 DIAGNOSIS — E78.2 MIXED HYPERLIPIDEMIA: ICD-10-CM

## 2025-04-03 DIAGNOSIS — I11.9 MALIGNANT HYPERTENSIVE HEART DISEASE WITHOUT HEART FAILURE: ICD-10-CM

## 2025-04-03 PROCEDURE — 2022F DILAT RTA XM EVC RTNOPTHY: CPT | Performed by: FAMILY MEDICINE

## 2025-04-03 PROCEDURE — 1123F ACP DISCUSS/DSCN MKR DOCD: CPT | Performed by: FAMILY MEDICINE

## 2025-04-03 PROCEDURE — 1126F AMNT PAIN NOTED NONE PRSNT: CPT | Performed by: FAMILY MEDICINE

## 2025-04-03 PROCEDURE — 3052F HG A1C>EQUAL 8.0%<EQUAL 9.0%: CPT | Performed by: FAMILY MEDICINE

## 2025-04-03 PROCEDURE — 3075F SYST BP GE 130 - 139MM HG: CPT | Performed by: FAMILY MEDICINE

## 2025-04-03 PROCEDURE — 4004F PT TOBACCO SCREEN RCVD TLK: CPT | Performed by: FAMILY MEDICINE

## 2025-04-03 PROCEDURE — 3017F COLORECTAL CA SCREEN DOC REV: CPT | Performed by: FAMILY MEDICINE

## 2025-04-03 PROCEDURE — 3078F DIAST BP <80 MM HG: CPT | Performed by: FAMILY MEDICINE

## 2025-04-03 PROCEDURE — G8417 CALC BMI ABV UP PARAM F/U: HCPCS | Performed by: FAMILY MEDICINE

## 2025-04-03 PROCEDURE — 1159F MED LIST DOCD IN RCRD: CPT | Performed by: FAMILY MEDICINE

## 2025-04-03 PROCEDURE — 99214 OFFICE O/P EST MOD 30 MIN: CPT | Performed by: FAMILY MEDICINE

## 2025-04-03 PROCEDURE — G8427 DOCREV CUR MEDS BY ELIG CLIN: HCPCS | Performed by: FAMILY MEDICINE

## 2025-04-03 PROCEDURE — 3046F HEMOGLOBIN A1C LEVEL >9.0%: CPT | Performed by: FAMILY MEDICINE

## 2025-04-03 RX ORDER — ATORVASTATIN CALCIUM 40 MG/1
40 TABLET, FILM COATED ORAL DAILY
Qty: 90 TABLET | Refills: 0 | Status: SHIPPED | OUTPATIENT
Start: 2025-04-03

## 2025-04-03 RX ORDER — METOPROLOL SUCCINATE 50 MG/1
50 TABLET, EXTENDED RELEASE ORAL DAILY
Qty: 90 TABLET | Refills: 0 | Status: SHIPPED | OUTPATIENT
Start: 2025-04-03

## 2025-04-03 RX ORDER — FAMOTIDINE 40 MG/1
40 TABLET, FILM COATED ORAL DAILY
COMMUNITY

## 2025-04-03 SDOH — ECONOMIC STABILITY: FOOD INSECURITY: WITHIN THE PAST 12 MONTHS, YOU WORRIED THAT YOUR FOOD WOULD RUN OUT BEFORE YOU GOT MONEY TO BUY MORE.: NEVER TRUE

## 2025-04-03 SDOH — ECONOMIC STABILITY: FOOD INSECURITY: WITHIN THE PAST 12 MONTHS, THE FOOD YOU BOUGHT JUST DIDN'T LAST AND YOU DIDN'T HAVE MONEY TO GET MORE.: NEVER TRUE

## 2025-04-03 NOTE — PROGRESS NOTES
Chief Complaint   Patient presents with    Follow-up Chronic Condition     Pt here for his follow up visit to have a form filled out for VSAP for a breathalyzer      /68 (BP Site: Right Upper Arm, Patient Position: Sitting, BP Cuff Size: Medium Adult)   Pulse 60   Temp 98.3 °F (36.8 °C) (Oral)   Resp 16   Ht 1.702 m (5' 7\")   Wt 76.7 kg (169 lb)   SpO2 97%   BMI 26.47 kg/m²     \"Have you been to the ER, urgent care clinic since your last visit?  Hospitalized since your last visit?\"    NO    “Have you seen or consulted any other health care providers outside our system since your last visit?”    NO      “Have you had a diabetic eye exam?”    NO     No diabetic eye exam on file       
canal and external ear normal.      Left Ear: Tympanic membrane, ear canal and external ear normal.      Nose: Nose normal.      Mouth/Throat:      Mouth: Mucous membranes are moist.      Pharynx: Oropharynx is clear.   Eyes:      Extraocular Movements: Extraocular movements intact.      Conjunctiva/sclera: Conjunctivae normal.      Pupils: Pupils are equal, round, and reactive to light.   Cardiovascular:      Rate and Rhythm: Normal rate and regular rhythm.      Pulses: Normal pulses.      Heart sounds: Normal heart sounds.   Pulmonary:      Effort: Pulmonary effort is normal.      Breath sounds: Normal breath sounds.   Abdominal:      General: Abdomen is flat. Bowel sounds are normal.      Palpations: Abdomen is soft.   Musculoskeletal:         General: Normal range of motion.      Cervical back: Normal range of motion and neck supple.   Skin:     General: Skin is warm and dry.      Capillary Refill: Capillary refill takes less than 2 seconds.   Neurological:      General: No focal deficit present.      Mental Status: He is alert and oriented to person, place, and time. Mental status is at baseline.   Psychiatric:         Mood and Affect: Mood normal.         Behavior: Behavior normal.         Thought Content: Thought content normal.         Judgment: Judgment normal.              No results found for this visit on 04/03/25.    Assessment/Plan:    ICD-10-CM    1. Poorly controlled diabetes mellitus (HCC)  E11.65       2. Mixed hyperlipidemia  E78.2       3. Tobacco abuse counseling  Z71.6 [facility]uso [facility], [facility]      4. Tobacco dependence  F17.200 [facility]ancuso [facility], [facility]      5. Dyspnea on exertion  R06.09 Romario Mancuso MD, Pulmonology, [place]      6. Hx of myocardial infarction, greater than 8 weeks  I25.2       7. Malignant hypertensive heart disease without heart failure  I11.9       8. Gastroesophageal reflux disease with esophagitis without

## 2025-04-24 RX ORDER — ATORVASTATIN CALCIUM 40 MG/1
40 TABLET, FILM COATED ORAL DAILY
Qty: 90 TABLET | Refills: 0 | Status: SHIPPED | OUTPATIENT
Start: 2025-04-24

## 2025-04-24 RX ORDER — METOPROLOL SUCCINATE 50 MG/1
50 TABLET, EXTENDED RELEASE ORAL DAILY
Qty: 90 TABLET | Refills: 0 | Status: SHIPPED | OUTPATIENT
Start: 2025-04-24

## 2025-05-08 ENCOUNTER — TELEPHONE (OUTPATIENT)
Facility: CLINIC | Age: 75
End: 2025-05-08

## 2025-05-08 ASSESSMENT — LIFESTYLE VARIABLES
HOW MANY STANDARD DRINKS CONTAINING ALCOHOL DO YOU HAVE ON A TYPICAL DAY: 1
HOW OFTEN DO YOU HAVE A DRINK CONTAINING ALCOHOL: 2
HOW OFTEN DO YOU HAVE SIX OR MORE DRINKS ON ONE OCCASION: 1

## 2025-05-08 NOTE — TELEPHONE ENCOUNTER
Per med list, PCP refilled meds for pt, pt is in need of refills on his diabetic medications, called pt, spoke with pt and stated he see endo for his diabetes and he needs to contact that clinic for refills on his diabetic medications, pt verified understanding

## 2025-05-13 DIAGNOSIS — Z79.4 TYPE 2 DIABETES MELLITUS WITH HYPERGLYCEMIA, WITH LONG-TERM CURRENT USE OF INSULIN (HCC): Primary | ICD-10-CM

## 2025-05-13 DIAGNOSIS — E11.65 TYPE 2 DIABETES MELLITUS WITH HYPERGLYCEMIA, WITH LONG-TERM CURRENT USE OF INSULIN (HCC): Primary | ICD-10-CM

## 2025-05-13 NOTE — TELEPHONE ENCOUNTER
Patient called in to get a refill     empagliflozin (JARDIANCE) 25 MG tablet     [facility] [address] [place] [address] - [address] [place] [address] - P [phone] - F [phone]  [address] [place] [id number]  Phone: [phone]  Fax: [phone]

## 2025-07-21 ENCOUNTER — TELEPHONE (OUTPATIENT)
Facility: CLINIC | Age: 75
End: 2025-07-21

## 2025-07-21 NOTE — TELEPHONE ENCOUNTER
Attempted to contact patient regarding upcoming Medicare wellness appointment and completion of HRA questionnaire.Unable to LVM d/t full mailbox.

## 2025-07-22 ENCOUNTER — OFFICE VISIT (OUTPATIENT)
Facility: CLINIC | Age: 75
End: 2025-07-22
Payer: MEDICARE

## 2025-07-22 VITALS
OXYGEN SATURATION: 97 % | WEIGHT: 168.4 LBS | HEART RATE: 60 BPM | HEIGHT: 67 IN | DIASTOLIC BLOOD PRESSURE: 71 MMHG | SYSTOLIC BLOOD PRESSURE: 132 MMHG | RESPIRATION RATE: 16 BRPM | TEMPERATURE: 98.1 F | BODY MASS INDEX: 26.43 KG/M2

## 2025-07-22 DIAGNOSIS — Z79.4 TYPE 2 DIABETES MELLITUS WITH HYPERGLYCEMIA, WITH LONG-TERM CURRENT USE OF INSULIN (HCC): ICD-10-CM

## 2025-07-22 DIAGNOSIS — F17.200 TOBACCO DEPENDENCE: ICD-10-CM

## 2025-07-22 DIAGNOSIS — Z95.810 IMPLANTABLE CARDIOVERTER-DEFIBRILLATOR (ICD) IN SITU: ICD-10-CM

## 2025-07-22 DIAGNOSIS — E11.65 TYPE 2 DIABETES MELLITUS WITH HYPERGLYCEMIA, WITH LONG-TERM CURRENT USE OF INSULIN (HCC): ICD-10-CM

## 2025-07-22 DIAGNOSIS — K21.00 GASTROESOPHAGEAL REFLUX DISEASE WITH ESOPHAGITIS WITHOUT HEMORRHAGE: ICD-10-CM

## 2025-07-22 DIAGNOSIS — Z71.6 TOBACCO ABUSE COUNSELING: ICD-10-CM

## 2025-07-22 DIAGNOSIS — R01.1 SYSTOLIC MURMUR: ICD-10-CM

## 2025-07-22 DIAGNOSIS — E78.2 MIXED HYPERLIPIDEMIA: ICD-10-CM

## 2025-07-22 DIAGNOSIS — I11.9 MALIGNANT HYPERTENSIVE HEART DISEASE WITHOUT HEART FAILURE: Primary | ICD-10-CM

## 2025-07-22 PROCEDURE — 3075F SYST BP GE 130 - 139MM HG: CPT | Performed by: FAMILY MEDICINE

## 2025-07-22 PROCEDURE — 1126F AMNT PAIN NOTED NONE PRSNT: CPT | Performed by: FAMILY MEDICINE

## 2025-07-22 PROCEDURE — 1123F ACP DISCUSS/DSCN MKR DOCD: CPT | Performed by: FAMILY MEDICINE

## 2025-07-22 PROCEDURE — 3052F HG A1C>EQUAL 8.0%<EQUAL 9.0%: CPT | Performed by: FAMILY MEDICINE

## 2025-07-22 PROCEDURE — 99214 OFFICE O/P EST MOD 30 MIN: CPT | Performed by: FAMILY MEDICINE

## 2025-07-22 PROCEDURE — 1160F RVW MEDS BY RX/DR IN RCRD: CPT | Performed by: FAMILY MEDICINE

## 2025-07-22 PROCEDURE — 1159F MED LIST DOCD IN RCRD: CPT | Performed by: FAMILY MEDICINE

## 2025-07-22 PROCEDURE — 3078F DIAST BP <80 MM HG: CPT | Performed by: FAMILY MEDICINE

## 2025-07-22 RX ORDER — METOPROLOL SUCCINATE 50 MG/1
50 TABLET, EXTENDED RELEASE ORAL DAILY
Qty: 90 TABLET | Refills: 0 | Status: SHIPPED | OUTPATIENT
Start: 2025-07-22

## 2025-07-22 ASSESSMENT — PATIENT HEALTH QUESTIONNAIRE - PHQ9
SUM OF ALL RESPONSES TO PHQ QUESTIONS 1-9: 0
2. FEELING DOWN, DEPRESSED OR HOPELESS: NOT AT ALL
1. LITTLE INTEREST OR PLEASURE IN DOING THINGS: NOT AT ALL
SUM OF ALL RESPONSES TO PHQ QUESTIONS 1-9: 0

## 2025-07-22 ASSESSMENT — LIFESTYLE VARIABLES
HOW OFTEN DO YOU HAVE A DRINK CONTAINING ALCOHOL: NEVER
HOW MANY STANDARD DRINKS CONTAINING ALCOHOL DO YOU HAVE ON A TYPICAL DAY: PATIENT DOES NOT DRINK

## 2025-07-22 NOTE — PROGRESS NOTES
Chief Complaint   Patient presents with    Medicare AWV     Pt here for his medicare AW     /71 (BP Site: Right Upper Arm, Patient Position: Sitting, BP Cuff Size: Medium Adult)   Pulse 60   Temp 98.1 °F (36.7 °C) (Oral)   Resp 16   Ht 1.702 m (5' 7\")   Wt 76.4 kg (168 lb 6.4 oz)   SpO2 97%   BMI 26.38 kg/m²     Have you been to the ER, urgent care clinic since your last visit?  Hospitalized since your last visit?   NO    Have you seen or consulted any other health care providers outside our system since your last visit?   NO      “Have you had a diabetic eye exam?”    NO     No diabetic eye exam on file
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
## 2025-08-08 PROBLEM — Z86.73 HISTORY OF STROKE: Status: ACTIVE | Noted: 2025-01-08

## 2025-08-27 ENCOUNTER — OFFICE VISIT (OUTPATIENT)
Age: 75
End: 2025-08-27
Payer: MEDICARE

## 2025-08-27 VITALS
HEIGHT: 67 IN | TEMPERATURE: 98.4 F | HEART RATE: 64 BPM | DIASTOLIC BLOOD PRESSURE: 79 MMHG | OXYGEN SATURATION: 98 % | BODY MASS INDEX: 24.66 KG/M2 | RESPIRATION RATE: 17 BRPM | SYSTOLIC BLOOD PRESSURE: 120 MMHG | WEIGHT: 157.1 LBS

## 2025-08-27 DIAGNOSIS — E11.65 UNCONTROLLED TYPE 2 DIABETES MELLITUS WITH HYPERGLYCEMIA (HCC): Primary | ICD-10-CM

## 2025-08-27 DIAGNOSIS — E11.65 TYPE 2 DIABETES MELLITUS WITH HYPERGLYCEMIA, WITH LONG-TERM CURRENT USE OF INSULIN (HCC): ICD-10-CM

## 2025-08-27 DIAGNOSIS — E11.65 UNCONTROLLED TYPE 2 DIABETES MELLITUS WITH HYPERGLYCEMIA (HCC): ICD-10-CM

## 2025-08-27 DIAGNOSIS — E78.5 DYSLIPIDEMIA: ICD-10-CM

## 2025-08-27 DIAGNOSIS — Z79.4 TYPE 2 DIABETES MELLITUS WITH HYPERGLYCEMIA, WITH LONG-TERM CURRENT USE OF INSULIN (HCC): ICD-10-CM

## 2025-08-27 PROCEDURE — 3052F HG A1C>EQUAL 8.0%<EQUAL 9.0%: CPT | Performed by: STUDENT IN AN ORGANIZED HEALTH CARE EDUCATION/TRAINING PROGRAM

## 2025-08-27 PROCEDURE — 99214 OFFICE O/P EST MOD 30 MIN: CPT | Performed by: STUDENT IN AN ORGANIZED HEALTH CARE EDUCATION/TRAINING PROGRAM

## 2025-08-27 PROCEDURE — 95251 CONT GLUC MNTR ANALYSIS I&R: CPT | Performed by: STUDENT IN AN ORGANIZED HEALTH CARE EDUCATION/TRAINING PROGRAM

## 2025-08-27 PROCEDURE — 3074F SYST BP LT 130 MM HG: CPT | Performed by: STUDENT IN AN ORGANIZED HEALTH CARE EDUCATION/TRAINING PROGRAM

## 2025-08-27 PROCEDURE — 3078F DIAST BP <80 MM HG: CPT | Performed by: STUDENT IN AN ORGANIZED HEALTH CARE EDUCATION/TRAINING PROGRAM

## 2025-08-27 PROCEDURE — 1159F MED LIST DOCD IN RCRD: CPT | Performed by: STUDENT IN AN ORGANIZED HEALTH CARE EDUCATION/TRAINING PROGRAM

## 2025-08-27 PROCEDURE — 1126F AMNT PAIN NOTED NONE PRSNT: CPT | Performed by: STUDENT IN AN ORGANIZED HEALTH CARE EDUCATION/TRAINING PROGRAM

## 2025-08-27 PROCEDURE — 1123F ACP DISCUSS/DSCN MKR DOCD: CPT | Performed by: STUDENT IN AN ORGANIZED HEALTH CARE EDUCATION/TRAINING PROGRAM

## 2025-08-27 RX ORDER — HYDROCHLOROTHIAZIDE 12.5 MG/1
1 CAPSULE ORAL
COMMUNITY
End: 2025-08-27

## 2025-08-27 RX ORDER — PEN NEEDLE, DIABETIC 30 GX3/16"
NEEDLE, DISPOSABLE MISCELLANEOUS
Qty: 300 EACH | Refills: 5 | Status: SHIPPED | OUTPATIENT
Start: 2025-08-27

## 2025-08-27 RX ORDER — INSULIN GLARGINE 100 [IU]/ML
INJECTION, SOLUTION SUBCUTANEOUS
Qty: 5 ADJUSTABLE DOSE PRE-FILLED PEN SYRINGE | Refills: 3 | Status: SHIPPED | OUTPATIENT
Start: 2025-08-27

## 2025-08-27 RX ORDER — LATANOPROST 50 UG/ML
1 SOLUTION/ DROPS OPHTHALMIC NIGHTLY
COMMUNITY
Start: 2025-07-25

## 2025-08-27 RX ORDER — KETOROLAC TROMETHAMINE 30 MG/ML
1 INJECTION, SOLUTION INTRAMUSCULAR; INTRAVENOUS DAILY
COMMUNITY

## 2025-08-27 RX ORDER — PIOGLITAZONE 30 MG/1
30 TABLET ORAL DAILY
Qty: 30 TABLET | Refills: 3 | Status: SHIPPED | OUTPATIENT
Start: 2025-08-27

## 2025-08-27 RX ORDER — HYDROCHLOROTHIAZIDE 12.5 MG/1
CAPSULE ORAL
Qty: 6 EACH | Refills: 1 | Status: SHIPPED | OUTPATIENT
Start: 2025-08-27

## 2025-08-30 LAB
ALBUMIN SERPL-MCNC: 4.3 G/DL (ref 3.8–4.8)
ALBUMIN/CREAT UR: 15 MG/G CREAT (ref 0–29)
ALP SERPL-CCNC: 93 IU/L (ref 44–121)
ALT SERPL-CCNC: 11 IU/L (ref 0–44)
AST SERPL-CCNC: 19 IU/L (ref 0–40)
BILIRUB SERPL-MCNC: 0.7 MG/DL (ref 0–1.2)
BUN SERPL-MCNC: 18 MG/DL (ref 8–27)
BUN/CREAT SERPL: 16 (ref 10–24)
CALCIUM SERPL-MCNC: 9.6 MG/DL (ref 8.6–10.2)
CHLORIDE SERPL-SCNC: 102 MMOL/L (ref 96–106)
CHOLEST SERPL-MCNC: 116 MG/DL (ref 100–199)
CO2 SERPL-SCNC: 23 MMOL/L (ref 20–29)
CREAT SERPL-MCNC: 1.13 MG/DL (ref 0.76–1.27)
CREAT UR-MCNC: 140.5 MG/DL
EGFRCR SERPLBLD CKD-EPI 2021: 68 ML/MIN/1.73
EST. AVERAGE GLUCOSE BLD GHB EST-MCNC: 289 MG/DL
GLOBULIN SER CALC-MCNC: 2.3 G/DL (ref 1.5–4.5)
GLUCOSE SERPL-MCNC: 175 MG/DL (ref 70–99)
HBA1C MFR BLD: 11.7 % (ref 4.8–5.6)
HCT VFR BLD AUTO: 46.5 % (ref 37.5–51)
HDLC SERPL-MCNC: 40 MG/DL
HGB BLD-MCNC: 15 G/DL (ref 13–17.7)
LDLC SERPL CALC-MCNC: 57 MG/DL (ref 0–99)
MICROALBUMIN UR-MCNC: 20.4 UG/ML
POTASSIUM SERPL-SCNC: 5.1 MMOL/L (ref 3.5–5.2)
PROT SERPL-MCNC: 6.6 G/DL (ref 6–8.5)
SODIUM SERPL-SCNC: 141 MMOL/L (ref 134–144)
TRIGL SERPL-MCNC: 102 MG/DL (ref 0–149)
VLDLC SERPL CALC-MCNC: 19 MG/DL (ref 5–40)

## (undated) DEVICE — MASK ANES INF SZ 2 PREM TAIL VLV INFL PRT UNSCENTED SGL PT